# Patient Record
Sex: FEMALE | Race: OTHER | HISPANIC OR LATINO
[De-identification: names, ages, dates, MRNs, and addresses within clinical notes are randomized per-mention and may not be internally consistent; named-entity substitution may affect disease eponyms.]

---

## 2019-03-01 PROBLEM — Z00.00 ENCOUNTER FOR PREVENTIVE HEALTH EXAMINATION: Status: ACTIVE | Noted: 2019-03-01

## 2019-03-27 ENCOUNTER — APPOINTMENT (OUTPATIENT)
Dept: OBGYN | Facility: CLINIC | Age: 34
End: 2019-03-27
Payer: MEDICAID

## 2019-03-27 VITALS
BODY MASS INDEX: 23.57 KG/M2 | HEIGHT: 63 IN | DIASTOLIC BLOOD PRESSURE: 80 MMHG | SYSTOLIC BLOOD PRESSURE: 118 MMHG | WEIGHT: 133 LBS

## 2019-03-27 DIAGNOSIS — Z30.09 ENCOUNTER FOR OTHER GENERAL COUNSELING AND ADVICE ON CONTRACEPTION: ICD-10-CM

## 2019-03-27 PROCEDURE — 99385 PREV VISIT NEW AGE 18-39: CPT

## 2019-03-27 NOTE — COUNSELING
[Breast Self Exam] : breast self exam [Nutrition] : nutrition [Exercise] : exercise [Vitamins/Supplements] : vitamins/supplements [STD (testing, results, tx)] : STD (testing, results, tx) [Contraception] : contraception [Vaccines] : vaccines

## 2020-07-15 ENCOUNTER — APPOINTMENT (OUTPATIENT)
Dept: OBGYN | Facility: CLINIC | Age: 35
End: 2020-07-15

## 2020-11-04 ENCOUNTER — LABORATORY RESULT (OUTPATIENT)
Age: 35
End: 2020-11-04

## 2020-11-05 ENCOUNTER — ASOB RESULT (OUTPATIENT)
Age: 35
End: 2020-11-05

## 2020-11-05 ENCOUNTER — APPOINTMENT (OUTPATIENT)
Dept: OBGYN | Facility: CLINIC | Age: 35
End: 2020-11-05
Payer: MEDICAID

## 2020-11-05 VITALS — BODY MASS INDEX: 22.67 KG/M2 | DIASTOLIC BLOOD PRESSURE: 72 MMHG | WEIGHT: 128 LBS | SYSTOLIC BLOOD PRESSURE: 110 MMHG

## 2020-11-05 DIAGNOSIS — D64.9 ANEMIA, UNSPECIFIED: ICD-10-CM

## 2020-11-05 PROCEDURE — 76856 US EXAM PELVIC COMPLETE: CPT

## 2020-11-05 PROCEDURE — 99395 PREV VISIT EST AGE 18-39: CPT

## 2020-11-05 NOTE — HISTORY OF PRESENT ILLNESS
[FreeTextEntry1] : here for annual \par complains of heavy menses lasting 2 weeks for years\par also has anemia \par has btl

## 2020-11-25 ENCOUNTER — APPOINTMENT (OUTPATIENT)
Dept: OBGYN | Facility: CLINIC | Age: 35
End: 2020-11-25
Payer: MEDICAID

## 2020-11-25 VITALS — BODY MASS INDEX: 22.32 KG/M2 | SYSTOLIC BLOOD PRESSURE: 110 MMHG | WEIGHT: 126 LBS | DIASTOLIC BLOOD PRESSURE: 68 MMHG

## 2020-11-25 PROCEDURE — 99212 OFFICE O/P EST SF 10 MIN: CPT

## 2020-11-25 NOTE — HISTORY OF PRESENT ILLNESS
[FreeTextEntry1] : Patient presents with complaint of vaginal irritation.  Reports internal and external irritation with thick white discharged.  + yeast infection on exam.

## 2020-11-25 NOTE — PHYSICAL EXAM
[Appropriately responsive] : appropriately responsive [Alert] : alert [No Acute Distress] : no acute distress [No Lymphadenopathy] : no lymphadenopathy [Regular Rate Rhythm] : regular rate rhythm [No Murmurs] : no murmurs [Clear to Auscultation B/L] : clear to auscultation bilaterally [Soft] : soft [Non-tender] : non-tender [Non-distended] : non-distended [No HSM] : No HSM [No Lesions] : no lesions [No Mass] : no mass [Oriented x3] : oriented x3 [Labia Majora] : normal [Labia Minora] : normal [Discharge] : a  ~M vaginal discharge was present [Normal] : normal [Uterine Adnexae] : normal [FreeTextEntry4] : + yeast infection

## 2020-11-25 NOTE — COUNSELING
[Bladder Hygiene] : bladder hygiene [STD (testing, results, tx)] : STD (testing, results, tx) [Vaccines] : vaccines

## 2020-12-08 ENCOUNTER — APPOINTMENT (OUTPATIENT)
Dept: OBGYN | Facility: CLINIC | Age: 35
End: 2020-12-08
Payer: MEDICAID

## 2020-12-08 VITALS — BODY MASS INDEX: 22.67 KG/M2 | SYSTOLIC BLOOD PRESSURE: 114 MMHG | WEIGHT: 128 LBS | DIASTOLIC BLOOD PRESSURE: 72 MMHG

## 2020-12-08 PROCEDURE — 99212 OFFICE O/P EST SF 10 MIN: CPT

## 2020-12-08 PROCEDURE — 99072 ADDL SUPL MATRL&STAF TM PHE: CPT

## 2020-12-08 NOTE — PHYSICAL EXAM
[Normal] : uterus [No Bleeding] : there was no active vaginal bleeding [Uterine Adnexae] : were not tender and not enlarged [FreeTextEntry4] : + yeast infection

## 2021-03-01 ENCOUNTER — OUTPATIENT (OUTPATIENT)
Dept: OUTPATIENT SERVICES | Facility: HOSPITAL | Age: 36
LOS: 1 days | End: 2021-03-01
Payer: MEDICAID

## 2021-03-01 VITALS
DIASTOLIC BLOOD PRESSURE: 68 MMHG | SYSTOLIC BLOOD PRESSURE: 107 MMHG | HEIGHT: 63 IN | OXYGEN SATURATION: 100 % | WEIGHT: 128.09 LBS | RESPIRATION RATE: 16 BRPM | HEART RATE: 67 BPM | TEMPERATURE: 98 F

## 2021-03-01 DIAGNOSIS — N93.9 ABNORMAL UTERINE AND VAGINAL BLEEDING, UNSPECIFIED: ICD-10-CM

## 2021-03-01 DIAGNOSIS — J45.909 UNSPECIFIED ASTHMA, UNCOMPLICATED: ICD-10-CM

## 2021-03-01 DIAGNOSIS — Z29.9 ENCOUNTER FOR PROPHYLACTIC MEASURES, UNSPECIFIED: ICD-10-CM

## 2021-03-01 DIAGNOSIS — Z01.818 ENCOUNTER FOR OTHER PREPROCEDURAL EXAMINATION: ICD-10-CM

## 2021-03-01 DIAGNOSIS — Z98.51 TUBAL LIGATION STATUS: Chronic | ICD-10-CM

## 2021-03-01 LAB
ANION GAP SERPL CALC-SCNC: 5 MMOL/L — SIGNIFICANT CHANGE UP (ref 5–17)
ANISOCYTOSIS BLD QL: SIGNIFICANT CHANGE UP
APTT BLD: 31.3 SEC — SIGNIFICANT CHANGE UP (ref 27.5–35.5)
BLD GP AB SCN SERPL QL: SIGNIFICANT CHANGE UP
BUN SERPL-MCNC: 7 MG/DL — SIGNIFICANT CHANGE UP (ref 7–18)
CALCIUM SERPL-MCNC: 9.2 MG/DL — SIGNIFICANT CHANGE UP (ref 8.4–10.5)
CHLORIDE SERPL-SCNC: 105 MMOL/L — SIGNIFICANT CHANGE UP (ref 96–108)
CO2 SERPL-SCNC: 27 MMOL/L — SIGNIFICANT CHANGE UP (ref 22–31)
CREAT SERPL-MCNC: 0.62 MG/DL — SIGNIFICANT CHANGE UP (ref 0.5–1.3)
GLUCOSE SERPL-MCNC: 98 MG/DL — SIGNIFICANT CHANGE UP (ref 70–99)
HCT VFR BLD CALC: 30.3 % — LOW (ref 34.5–45)
HGB BLD-MCNC: 8.9 G/DL — LOW (ref 11.5–15.5)
HYPOCHROMIA BLD QL: SLIGHT — SIGNIFICANT CHANGE UP
INR BLD: 0.99 RATIO — SIGNIFICANT CHANGE UP (ref 0.88–1.16)
LG PLATELETS BLD QL AUTO: SLIGHT — SIGNIFICANT CHANGE UP
MANUAL DIF COMMENT BLD-IMP: SIGNIFICANT CHANGE UP
MCHC RBC-ENTMCNC: 18.2 PG — LOW (ref 27–34)
MCHC RBC-ENTMCNC: 29.4 GM/DL — LOW (ref 32–36)
MCV RBC AUTO: 61.8 FL — LOW (ref 80–100)
MICROCYTES BLD QL: SIGNIFICANT CHANGE UP
NRBC # BLD: 0 /100 WBCS — SIGNIFICANT CHANGE UP (ref 0–0)
PLAT MORPH BLD: NORMAL — SIGNIFICANT CHANGE UP
PLATELET # BLD AUTO: 389 K/UL — SIGNIFICANT CHANGE UP (ref 150–400)
POIKILOCYTOSIS BLD QL AUTO: SLIGHT — SIGNIFICANT CHANGE UP
POLYCHROMASIA BLD QL SMEAR: SLIGHT — SIGNIFICANT CHANGE UP
POTASSIUM SERPL-MCNC: 3.6 MMOL/L — SIGNIFICANT CHANGE UP (ref 3.5–5.3)
POTASSIUM SERPL-SCNC: 3.6 MMOL/L — SIGNIFICANT CHANGE UP (ref 3.5–5.3)
PROTHROM AB SERPL-ACNC: 11.8 SEC — SIGNIFICANT CHANGE UP (ref 10.6–13.6)
RBC # BLD: 4.9 M/UL — SIGNIFICANT CHANGE UP (ref 3.8–5.2)
RBC # FLD: 20.1 % — HIGH (ref 10.3–14.5)
RBC BLD AUTO: ABNORMAL
SODIUM SERPL-SCNC: 137 MMOL/L — SIGNIFICANT CHANGE UP (ref 135–145)
SPHEROCYTES BLD QL SMEAR: SLIGHT — SIGNIFICANT CHANGE UP
TARGETS BLD QL SMEAR: SLIGHT — SIGNIFICANT CHANGE UP
WBC # BLD: 7.97 K/UL — SIGNIFICANT CHANGE UP (ref 3.8–10.5)
WBC # FLD AUTO: 7.97 K/UL — SIGNIFICANT CHANGE UP (ref 3.8–10.5)

## 2021-03-01 PROCEDURE — G0463: CPT

## 2021-03-01 NOTE — H&P PST ADULT - NEGATIVE NEUROLOGICAL SYMPTOMS
no transient paralysis/no weakness/no paresthesias/no generalized seizures/no focal seizures/no syncope/no difficulty walking/no headache

## 2021-03-01 NOTE — H&P PST ADULT - NSANTHOSAYNRD_GEN_A_CORE
No. MAKENNA screening performed.  STOP BANG Legend: 0-2 = LOW Risk; 3-4 = INTERMEDIATE Risk; 5-8 = HIGH Risk

## 2021-03-01 NOTE — H&P PST ADULT - NSICDXPROBLEM_GEN_ALL_CORE_FT
PROBLEM DIAGNOSES  Problem: Prophylactic measure  Assessment and Plan: Preoperative instructions discussed with pt and given to pt. Instructed pt that no one will be allowed to come to hospital with patient , to notify security on arrival to the lobby of the hospital that today is day of surgery. Patient notified that will need someone to come to the hospital to  after surgery, not to eat or drink anything after midnight the night before the surgery, to avoid NSAIDs such as Ibuprofen, Motrin, Aleve, Advil, naproxen before surgery, to take Tylenol if needed for pain, to report exposure to anyone with any contagious diseases including Covid-19 or if patient is exhibiting any symptoms of COVID-19. Chlorhexidine 4% soap given to pt. Instructed about use of Chlorhexidine 4% soap before surgery. Patient verbalized understanding of instructions given.    Problem: Asthma  Assessment and Plan: Continue use of inhalers and use same on day of surgery. Follow-up with PCP postop for management      Problem: Abnormal uterine and vaginal bleeding, unspecified  Assessment and Plan: Patient  is scheduled for  dilation and curettage on 3/9/2021.

## 2021-03-01 NOTE — H&P PST ADULT - HISTORY OF PRESENT ILLNESS
35 years old female presented to [facility] for evaluation before surgery, patient was diagnosed with abnormal uterine and vaginal bleeding, unspecified and is scheduled for  dilation and curettage on 3/9/2021.

## 2021-03-03 PROBLEM — J45.909 UNSPECIFIED ASTHMA, UNCOMPLICATED: Chronic | Status: ACTIVE | Noted: 2021-03-01

## 2021-03-05 ENCOUNTER — APPOINTMENT (OUTPATIENT)
Dept: OBGYN | Facility: CLINIC | Age: 36
End: 2021-03-05
Payer: MEDICAID

## 2021-03-05 VITALS — SYSTOLIC BLOOD PRESSURE: 118 MMHG | WEIGHT: 131 LBS | BODY MASS INDEX: 23.21 KG/M2 | DIASTOLIC BLOOD PRESSURE: 74 MMHG

## 2021-03-05 PROCEDURE — 99072 ADDL SUPL MATRL&STAF TM PHE: CPT

## 2021-03-05 PROCEDURE — 99212 OFFICE O/P EST SF 10 MIN: CPT

## 2021-03-05 NOTE — HISTORY OF PRESENT ILLNESS
[FreeTextEntry1] : Patient presents with yeast infection.  Discussed not douching as she is getting a lot of yeast infections.  No other complaints.  Up to date on pap

## 2021-03-05 NOTE — PHYSICAL EXAM
[Appropriately responsive] : appropriately responsive [Alert] : alert [No Acute Distress] : no acute distress [No Lymphadenopathy] : no lymphadenopathy [Regular Rate Rhythm] : regular rate rhythm [No Murmurs] : no murmurs [Clear to Auscultation B/L] : clear to auscultation bilaterally [Soft] : soft [Non-tender] : non-tender [Non-distended] : non-distended [No HSM] : No HSM [No Lesions] : no lesions [No Mass] : no mass [Oriented x3] : oriented x3 [Labia Majora] : normal [Labia Minora] : normal [Normal] : normal [Uterine Adnexae] : normal [FreeTextEntry4] : + yeast infection

## 2021-03-06 ENCOUNTER — APPOINTMENT (OUTPATIENT)
Dept: DISASTER EMERGENCY | Facility: CLINIC | Age: 36
End: 2021-03-06

## 2021-03-07 LAB — SARS-COV-2 N GENE NPH QL NAA+PROBE: NOT DETECTED

## 2021-03-08 ENCOUNTER — TRANSCRIPTION ENCOUNTER (OUTPATIENT)
Age: 36
End: 2021-03-08

## 2021-03-09 ENCOUNTER — RESULT REVIEW (OUTPATIENT)
Age: 36
End: 2021-03-09

## 2021-03-09 ENCOUNTER — OUTPATIENT (OUTPATIENT)
Dept: OUTPATIENT SERVICES | Facility: HOSPITAL | Age: 36
LOS: 1 days | End: 2021-03-09
Payer: MEDICAID

## 2021-03-09 VITALS
RESPIRATION RATE: 17 BRPM | OXYGEN SATURATION: 100 % | TEMPERATURE: 98 F | SYSTOLIC BLOOD PRESSURE: 110 MMHG | HEART RATE: 70 BPM | DIASTOLIC BLOOD PRESSURE: 70 MMHG

## 2021-03-09 VITALS
RESPIRATION RATE: 16 BRPM | HEIGHT: 63 IN | DIASTOLIC BLOOD PRESSURE: 68 MMHG | TEMPERATURE: 99 F | WEIGHT: 128.09 LBS | HEART RATE: 72 BPM | OXYGEN SATURATION: 100 % | SYSTOLIC BLOOD PRESSURE: 99 MMHG

## 2021-03-09 DIAGNOSIS — Z98.51 TUBAL LIGATION STATUS: Chronic | ICD-10-CM

## 2021-03-09 DIAGNOSIS — Z01.818 ENCOUNTER FOR OTHER PREPROCEDURAL EXAMINATION: ICD-10-CM

## 2021-03-09 DIAGNOSIS — N93.9 ABNORMAL UTERINE AND VAGINAL BLEEDING, UNSPECIFIED: ICD-10-CM

## 2021-03-09 LAB
BLD GP AB SCN SERPL QL: SIGNIFICANT CHANGE UP
HCG UR QL: NEGATIVE — SIGNIFICANT CHANGE UP
HCT VFR BLD CALC: 29.9 % — LOW (ref 34.5–45)
HGB BLD-MCNC: 8.7 G/DL — LOW (ref 11.5–15.5)
MCHC RBC-ENTMCNC: 18.1 PG — LOW (ref 27–34)
MCHC RBC-ENTMCNC: 29.1 GM/DL — LOW (ref 32–36)
MCV RBC AUTO: 62.2 FL — LOW (ref 80–100)
NRBC # BLD: 0 /100 WBCS — SIGNIFICANT CHANGE UP (ref 0–0)
PLATELET # BLD AUTO: 485 K/UL — HIGH (ref 150–400)
RBC # BLD: 4.81 M/UL — SIGNIFICANT CHANGE UP (ref 3.8–5.2)
RBC # FLD: 19.6 % — HIGH (ref 10.3–14.5)
WBC # BLD: 7 K/UL — SIGNIFICANT CHANGE UP (ref 3.8–10.5)
WBC # FLD AUTO: 7 K/UL — SIGNIFICANT CHANGE UP (ref 3.8–10.5)

## 2021-03-09 PROCEDURE — 58558 HYSTEROSCOPY BIOPSY: CPT

## 2021-03-09 PROCEDURE — 86900 BLOOD TYPING SEROLOGIC ABO: CPT

## 2021-03-09 PROCEDURE — 88305 TISSUE EXAM BY PATHOLOGIST: CPT

## 2021-03-09 PROCEDURE — 86901 BLOOD TYPING SEROLOGIC RH(D): CPT

## 2021-03-09 PROCEDURE — 85027 COMPLETE CBC AUTOMATED: CPT

## 2021-03-09 PROCEDURE — 88305 TISSUE EXAM BY PATHOLOGIST: CPT | Mod: 26

## 2021-03-09 PROCEDURE — 81025 URINE PREGNANCY TEST: CPT

## 2021-03-09 PROCEDURE — 86850 RBC ANTIBODY SCREEN: CPT

## 2021-03-09 PROCEDURE — 36415 COLL VENOUS BLD VENIPUNCTURE: CPT

## 2021-03-09 RX ORDER — SODIUM CHLORIDE 9 MG/ML
3 INJECTION INTRAMUSCULAR; INTRAVENOUS; SUBCUTANEOUS EVERY 8 HOURS
Refills: 0 | Status: DISCONTINUED | OUTPATIENT
Start: 2021-03-09 | End: 2021-03-09

## 2021-03-09 RX ORDER — IBUPROFEN 200 MG
1 TABLET ORAL
Qty: 12 | Refills: 0
Start: 2021-03-09 | End: 2021-03-11

## 2021-03-09 RX ORDER — IBUPROFEN 200 MG
600 TABLET ORAL EVERY 6 HOURS
Refills: 0 | Status: DISCONTINUED | OUTPATIENT
Start: 2021-03-09 | End: 2021-03-16

## 2021-03-09 NOTE — ASU DISCHARGE PLAN (ADULT/PEDIATRIC) - CARE PROVIDER_API CALL
Charan Huynh  OBSTETRICS AND GYNECOLOGY  [address] [facility], [place] [address]  Phone: [phone]  Fax: [phone]  Follow Up Time: 2 weeks

## 2021-03-09 NOTE — ASU DISCHARGE PLAN (ADULT/PEDIATRIC) - "IF YOU OR YOUR GUARDIAN/FAMILY IS A SMOKER, IT IS IMPORTANT FOR YOUR HEALTH TO STOP SMOKING. PLEASE BE AWARE THAT SECOND HAND SMOKE IS ALSO HARMFUL."
Additional Notes: When pink patches turn white use derma-smooth an additional week then stop. Detail Level: Simple Statement Selected

## 2021-03-11 LAB — SURGICAL PATHOLOGY STUDY: SIGNIFICANT CHANGE UP

## 2021-03-24 ENCOUNTER — APPOINTMENT (OUTPATIENT)
Dept: OBGYN | Facility: CLINIC | Age: 36
End: 2021-03-24
Payer: MEDICAID

## 2021-03-24 VITALS — BODY MASS INDEX: 23.21 KG/M2 | SYSTOLIC BLOOD PRESSURE: 122 MMHG | WEIGHT: 131 LBS | DIASTOLIC BLOOD PRESSURE: 76 MMHG

## 2021-03-24 PROCEDURE — 99072 ADDL SUPL MATRL&STAF TM PHE: CPT

## 2021-03-24 PROCEDURE — 99212 OFFICE O/P EST SF 10 MIN: CPT

## 2021-03-24 RX ORDER — TRANEXAMIC ACID 650 MG/1
650 TABLET ORAL
Qty: 1 | Refills: 1 | Status: ACTIVE | COMMUNITY
Start: 2021-03-24 | End: 1900-01-01

## 2021-03-25 NOTE — HISTORY OF PRESENT ILLNESS
[Pain is well-controlled] : pain is well-controlled [Fever] : no fever [Chills] : no chills [Nausea] : no nausea [Vomiting] : no vomiting [Clean/Dry/Intact] : clean, dry and intact [Erythema] : not erythematous [None] : no vaginal bleeding [Normal] : normal [Pathology reviewed] : pathology reviewed [de-identified] : feels good discussed the results .

## 2021-04-08 ENCOUNTER — APPOINTMENT (OUTPATIENT)
Dept: OBGYN | Facility: CLINIC | Age: 36
End: 2021-04-08
Payer: MEDICAID

## 2021-04-08 VITALS — BODY MASS INDEX: 23.03 KG/M2 | DIASTOLIC BLOOD PRESSURE: 78 MMHG | SYSTOLIC BLOOD PRESSURE: 118 MMHG | WEIGHT: 130 LBS

## 2021-04-08 PROCEDURE — 99213 OFFICE O/P EST LOW 20 MIN: CPT

## 2021-04-08 PROCEDURE — 99072 ADDL SUPL MATRL&STAF TM PHE: CPT

## 2021-05-07 ENCOUNTER — APPOINTMENT (OUTPATIENT)
Dept: OBGYN | Facility: CLINIC | Age: 36
End: 2021-05-07
Payer: MEDICAID

## 2021-05-07 VITALS — DIASTOLIC BLOOD PRESSURE: 74 MMHG | BODY MASS INDEX: 23.91 KG/M2 | WEIGHT: 135 LBS | SYSTOLIC BLOOD PRESSURE: 116 MMHG

## 2021-05-07 DIAGNOSIS — B37.3 CANDIDIASIS OF VULVA AND VAGINA: ICD-10-CM

## 2021-05-07 PROCEDURE — 99072 ADDL SUPL MATRL&STAF TM PHE: CPT

## 2021-05-07 PROCEDURE — 99213 OFFICE O/P EST LOW 20 MIN: CPT

## 2021-05-07 NOTE — HISTORY OF PRESENT ILLNESS
[FreeTextEntry1] : she is having a recurrent episode of candidiasis\par she is currently on suppressive therapy

## 2021-05-07 NOTE — PHYSICAL EXAM
[Labia Majora] : normal [Labia Minora] : normal [Discharge] : a  ~M vaginal discharge was present [Normal] : normal [Uterine Adnexae] : normal [FreeTextEntry4] : yeast

## 2021-05-28 ENCOUNTER — APPOINTMENT (OUTPATIENT)
Dept: OBGYN | Facility: CLINIC | Age: 36
End: 2021-05-28
Payer: MEDICAID

## 2021-05-28 DIAGNOSIS — N92.0 EXCESSIVE AND FREQUENT MENSTRUATION WITH REGULAR CYCLE: ICD-10-CM

## 2021-05-28 DIAGNOSIS — R87.610 ATYPICAL SQUAMOUS CELLS OF UNDETERMINED SIGNIFICANCE ON CYTOLOGIC SMEAR OF CERVIX (ASC-US): ICD-10-CM

## 2021-05-28 PROCEDURE — 99213 OFFICE O/P EST LOW 20 MIN: CPT

## 2021-05-28 RX ORDER — FLUCONAZOLE 150 MG/1
150 TABLET ORAL
Qty: 1 | Refills: 0 | Status: DISCONTINUED | COMMUNITY
Start: 2020-11-25 | End: 2021-05-28

## 2021-05-28 RX ORDER — TERCONAZOLE 8 MG/G
0.8 CREAM VAGINAL
Qty: 1 | Refills: 0 | Status: DISCONTINUED | COMMUNITY
Start: 2020-12-08 | End: 2021-05-28

## 2021-05-28 RX ORDER — FLUCONAZOLE 150 MG/1
150 TABLET ORAL
Qty: 16 | Refills: 0 | Status: DISCONTINUED | COMMUNITY
Start: 2021-04-08 | End: 2021-05-28

## 2021-05-28 RX ORDER — METRONIDAZOLE 7.5 MG/G
0.75 GEL VAGINAL
Qty: 1 | Refills: 1 | Status: ACTIVE | COMMUNITY
Start: 2021-05-28 | End: 1900-01-01

## 2021-05-28 RX ORDER — MEDROXYPROGESTERONE ACETATE 10 MG/1
10 TABLET ORAL DAILY
Qty: 10 | Refills: 0 | Status: DISCONTINUED | COMMUNITY
Start: 2021-03-11 | End: 2021-05-28

## 2021-05-28 RX ORDER — FLUCONAZOLE 150 MG/1
150 TABLET ORAL
Qty: 3 | Refills: 0 | Status: DISCONTINUED | COMMUNITY
Start: 2021-04-08 | End: 2021-05-28

## 2021-05-28 RX ORDER — VALACYCLOVIR 500 MG/1
500 TABLET, FILM COATED ORAL TWICE DAILY
Qty: 30 | Refills: 2 | Status: DISCONTINUED | COMMUNITY
Start: 2019-03-27 | End: 2021-05-28

## 2021-05-28 RX ORDER — TERCONAZOLE 8 MG/G
0.8 CREAM VAGINAL
Qty: 1 | Refills: 0 | Status: DISCONTINUED | COMMUNITY
Start: 2021-05-07 | End: 2021-05-28

## 2021-05-28 RX ORDER — FLUCONAZOLE 150 MG/1
150 TABLET ORAL
Qty: 3 | Refills: 1 | Status: DISCONTINUED | COMMUNITY
Start: 2021-05-07 | End: 2021-05-28

## 2021-05-28 RX ORDER — NORGESTIMATE AND ETHINYL ESTRADIOL 7DAYSX3 LO
0.18/0.215/0.25 KIT ORAL DAILY
Qty: 1 | Refills: 11 | Status: DISCONTINUED | COMMUNITY
Start: 2019-03-27 | End: 2021-05-28

## 2021-05-28 RX ORDER — METRONIDAZOLE 500 MG/1
500 TABLET ORAL TWICE DAILY
Qty: 14 | Refills: 0 | Status: DISCONTINUED | COMMUNITY
Start: 2021-01-29 | End: 2021-05-28

## 2021-05-28 RX ORDER — CLOTRIMAZOLE AND BETAMETHASONE DIPROPIONATE 10; .5 MG/G; MG/G
1-0.05 CREAM TOPICAL TWICE DAILY
Qty: 1 | Refills: 0 | Status: DISCONTINUED | COMMUNITY
Start: 2019-03-27 | End: 2021-05-28

## 2021-05-28 NOTE — COUNSELING
[Nutrition/ Exercise/ Weight Management] : nutrition, exercise, weight management [Vaccines] : vaccines

## 2021-06-22 ENCOUNTER — LABORATORY RESULT (OUTPATIENT)
Age: 36
End: 2021-06-22

## 2021-06-23 ENCOUNTER — APPOINTMENT (OUTPATIENT)
Dept: OBGYN | Facility: CLINIC | Age: 36
End: 2021-06-23
Payer: MEDICAID

## 2021-06-23 VITALS — BODY MASS INDEX: 23.56 KG/M2 | WEIGHT: 133 LBS

## 2021-06-23 DIAGNOSIS — N93.9 ABNORMAL UTERINE AND VAGINAL BLEEDING, UNSPECIFIED: ICD-10-CM

## 2021-06-23 DIAGNOSIS — D25.0 SUBMUCOUS LEIOMYOMA OF UTERUS: ICD-10-CM

## 2021-06-23 PROCEDURE — 99213 OFFICE O/P EST LOW 20 MIN: CPT

## 2021-06-23 NOTE — HISTORY OF PRESENT ILLNESS
[FreeTextEntry1] : pt comes for repeat pap , she has heavy periods . discussed the submucosal fibroid and removal along with an ablation .

## 2021-07-01 ENCOUNTER — APPOINTMENT (OUTPATIENT)
Dept: OBGYN | Facility: CLINIC | Age: 36
End: 2021-07-01
Payer: MEDICAID

## 2021-07-01 DIAGNOSIS — R87.610 ATYPICAL SQUAMOUS CELLS OF UNDETERMINED SIGNIFICANCE ON CYTOLOGIC SMEAR OF CERVIX (ASC-US): ICD-10-CM

## 2021-07-01 DIAGNOSIS — R87.810 ATYPICAL SQUAMOUS CELLS OF UNDETERMINED SIGNIFICANCE ON CYTOLOGIC SMEAR OF CERVIX (ASC-US): ICD-10-CM

## 2021-07-01 PROCEDURE — 99213 OFFICE O/P EST LOW 20 MIN: CPT | Mod: 95

## 2021-07-14 ENCOUNTER — APPOINTMENT (OUTPATIENT)
Dept: OBGYN | Facility: CLINIC | Age: 36
End: 2021-07-14
Payer: MEDICAID

## 2021-07-14 VITALS — WEIGHT: 136 LBS | BODY MASS INDEX: 24.09 KG/M2 | SYSTOLIC BLOOD PRESSURE: 112 MMHG | DIASTOLIC BLOOD PRESSURE: 74 MMHG

## 2021-07-14 PROCEDURE — 57454 BX/CURETT OF CERVIX W/SCOPE: CPT

## 2021-07-14 RX ORDER — ETODOLAC 500 MG/1
500 TABLET, FILM COATED, EXTENDED RELEASE ORAL DAILY
Qty: 1 | Refills: 3 | Status: ACTIVE | COMMUNITY
Start: 2021-07-14 | End: 1900-01-01

## 2021-07-14 NOTE — PROCEDURE
[Colposcopy] : Colposcopy  [Time out performed] : Pre-procedure time out performed.  Patient's name, date of birth and procedure confirmed. [Consent Obtained] : Consent obtained [Risks] : risks [Benefits] : benefits [Alternatives] : alternatives [Patient] : patient [Infection] : infection [Bleeding] : bleeding [Allergic Reaction] : allergic reaction [HPV High Risk] : HPV high risk [No Premedication] : no premedication [Colposcopy Adequate] : colposcopy adequate [No Abnormalities] : no abnormalities [Hemostasis Obtained] : Hemostasis obtained [Tolerated Well] : the patient tolerated the procedure well [de-identified] : hpv

## 2021-07-23 ENCOUNTER — APPOINTMENT (OUTPATIENT)
Dept: OBGYN | Facility: CLINIC | Age: 36
End: 2021-07-23

## 2021-10-19 ENCOUNTER — OUTPATIENT (OUTPATIENT)
Dept: OUTPATIENT SERVICES | Facility: HOSPITAL | Age: 36
LOS: 1 days | End: 2021-10-19
Payer: MEDICAID

## 2021-10-19 VITALS
HEIGHT: 63 IN | SYSTOLIC BLOOD PRESSURE: 105 MMHG | TEMPERATURE: 99 F | RESPIRATION RATE: 16 BRPM | HEART RATE: 72 BPM | WEIGHT: 139.99 LBS | DIASTOLIC BLOOD PRESSURE: 72 MMHG | OXYGEN SATURATION: 98 %

## 2021-10-19 DIAGNOSIS — J45.909 UNSPECIFIED ASTHMA, UNCOMPLICATED: ICD-10-CM

## 2021-10-19 DIAGNOSIS — Z01.818 ENCOUNTER FOR OTHER PREPROCEDURAL EXAMINATION: ICD-10-CM

## 2021-10-19 DIAGNOSIS — N93.9 ABNORMAL UTERINE AND VAGINAL BLEEDING, UNSPECIFIED: ICD-10-CM

## 2021-10-19 DIAGNOSIS — Z98.51 TUBAL LIGATION STATUS: Chronic | ICD-10-CM

## 2021-10-19 DIAGNOSIS — D25.0 SUBMUCOUS LEIOMYOMA OF UTERUS: ICD-10-CM

## 2021-10-19 DIAGNOSIS — Z29.9 ENCOUNTER FOR PROPHYLACTIC MEASURES, UNSPECIFIED: ICD-10-CM

## 2021-10-19 DIAGNOSIS — Z98.890 OTHER SPECIFIED POSTPROCEDURAL STATES: Chronic | ICD-10-CM

## 2021-10-19 LAB
ANION GAP SERPL CALC-SCNC: 6 MMOL/L — SIGNIFICANT CHANGE UP (ref 5–17)
BLD GP AB SCN SERPL QL: SIGNIFICANT CHANGE UP
BUN SERPL-MCNC: 13 MG/DL — SIGNIFICANT CHANGE UP (ref 7–18)
CALCIUM SERPL-MCNC: 9.4 MG/DL — SIGNIFICANT CHANGE UP (ref 8.4–10.5)
CHLORIDE SERPL-SCNC: 105 MMOL/L — SIGNIFICANT CHANGE UP (ref 96–108)
CO2 SERPL-SCNC: 28 MMOL/L — SIGNIFICANT CHANGE UP (ref 22–31)
CREAT SERPL-MCNC: 0.64 MG/DL — SIGNIFICANT CHANGE UP (ref 0.5–1.3)
GLUCOSE SERPL-MCNC: 104 MG/DL — HIGH (ref 70–99)
HCT VFR BLD CALC: 38.4 % — SIGNIFICANT CHANGE UP (ref 34.5–45)
HGB BLD-MCNC: 12.7 G/DL — SIGNIFICANT CHANGE UP (ref 11.5–15.5)
MCHC RBC-ENTMCNC: 29.1 PG — SIGNIFICANT CHANGE UP (ref 27–34)
MCHC RBC-ENTMCNC: 33.1 GM/DL — SIGNIFICANT CHANGE UP (ref 32–36)
MCV RBC AUTO: 87.9 FL — SIGNIFICANT CHANGE UP (ref 80–100)
NRBC # BLD: 0 /100 WBCS — SIGNIFICANT CHANGE UP (ref 0–0)
PLATELET # BLD AUTO: 380 K/UL — SIGNIFICANT CHANGE UP (ref 150–400)
POTASSIUM SERPL-MCNC: 4.1 MMOL/L — SIGNIFICANT CHANGE UP (ref 3.5–5.3)
POTASSIUM SERPL-SCNC: 4.1 MMOL/L — SIGNIFICANT CHANGE UP (ref 3.5–5.3)
RBC # BLD: 4.37 M/UL — SIGNIFICANT CHANGE UP (ref 3.8–5.2)
RBC # FLD: 12.1 % — SIGNIFICANT CHANGE UP (ref 10.3–14.5)
SODIUM SERPL-SCNC: 139 MMOL/L — SIGNIFICANT CHANGE UP (ref 135–145)
WBC # BLD: 8.04 K/UL — SIGNIFICANT CHANGE UP (ref 3.8–10.5)
WBC # FLD AUTO: 8.04 K/UL — SIGNIFICANT CHANGE UP (ref 3.8–10.5)

## 2021-10-19 PROCEDURE — 93005 ELECTROCARDIOGRAM TRACING: CPT

## 2021-10-19 PROCEDURE — G0463: CPT

## 2021-10-19 NOTE — H&P PST ADULT - NSICDXPASTMEDICALHX_GEN_ALL_CORE_FT
PAST MEDICAL HISTORY:  Asthma      PAST MEDICAL HISTORY:  Anemia .  Received iron infusion x 3    Asthma

## 2021-10-19 NOTE — H&P PST ADULT - HISTORY OF PRESENT ILLNESS
35 yo female with history of Asthma (last attack x 10 years, no intubation), reports the above.  She is scheduled for : Hysteroscopic Myomectomy  Dilation and Curettage   Hysteroscopy with Endometrial Ablation, on 10/26/21 37 yo female with history of Asthma (last attack x 10 years, no intubation), Anemia, reports the above.  She is scheduled for : Hysteroscopic Myomectomy  Dilation and Curettage   Hysteroscopy with Endometrial Ablation, on 10/26/21

## 2021-10-19 NOTE — H&P PST ADULT - ASSESSMENT
37 yo female is scheduled for : Hysteroscopic Myomectomy  Dilation and Curettage   Hysteroscopy with Endometrial Ablation, on 10/26/21

## 2021-10-23 ENCOUNTER — APPOINTMENT (OUTPATIENT)
Dept: DISASTER EMERGENCY | Facility: CLINIC | Age: 36
End: 2021-10-23

## 2021-10-23 DIAGNOSIS — Z01.818 ENCOUNTER FOR OTHER PREPROCEDURAL EXAMINATION: ICD-10-CM

## 2021-10-24 LAB — SARS-COV-2 N GENE NPH QL NAA+PROBE: NOT DETECTED

## 2021-10-25 ENCOUNTER — TRANSCRIPTION ENCOUNTER (OUTPATIENT)
Age: 36
End: 2021-10-25

## 2021-10-26 ENCOUNTER — INPATIENT (INPATIENT)
Facility: HOSPITAL | Age: 36
LOS: 0 days | Discharge: HOME | DRG: 743 | End: 2021-10-27
Attending: OBSTETRICS & GYNECOLOGY | Admitting: OBSTETRICS & GYNECOLOGY
Payer: MEDICAID

## 2021-10-26 ENCOUNTER — RESULT REVIEW (OUTPATIENT)
Age: 36
End: 2021-10-26

## 2021-10-26 VITALS
SYSTOLIC BLOOD PRESSURE: 102 MMHG | RESPIRATION RATE: 16 BRPM | WEIGHT: 139.99 LBS | TEMPERATURE: 99 F | DIASTOLIC BLOOD PRESSURE: 75 MMHG | HEART RATE: 68 BPM | OXYGEN SATURATION: 99 %

## 2021-10-26 DIAGNOSIS — D64.9 ANEMIA, UNSPECIFIED: ICD-10-CM

## 2021-10-26 DIAGNOSIS — D25.0 SUBMUCOUS LEIOMYOMA OF UTERUS: ICD-10-CM

## 2021-10-26 DIAGNOSIS — J45.909 UNSPECIFIED ASTHMA, UNCOMPLICATED: ICD-10-CM

## 2021-10-26 DIAGNOSIS — Z98.890 OTHER SPECIFIED POSTPROCEDURAL STATES: Chronic | ICD-10-CM

## 2021-10-26 DIAGNOSIS — G89.18 OTHER ACUTE POSTPROCEDURAL PAIN: ICD-10-CM

## 2021-10-26 DIAGNOSIS — D62 ACUTE POSTHEMORRHAGIC ANEMIA: ICD-10-CM

## 2021-10-26 DIAGNOSIS — R10.9 UNSPECIFIED ABDOMINAL PAIN: ICD-10-CM

## 2021-10-26 DIAGNOSIS — Z98.51 TUBAL LIGATION STATUS: Chronic | ICD-10-CM

## 2021-10-26 DIAGNOSIS — N93.9 ABNORMAL UTERINE AND VAGINAL BLEEDING, UNSPECIFIED: ICD-10-CM

## 2021-10-26 LAB
BLD GP AB SCN SERPL QL: SIGNIFICANT CHANGE UP
HCG UR QL: NEGATIVE — SIGNIFICANT CHANGE UP

## 2021-10-26 PROCEDURE — 88305 TISSUE EXAM BY PATHOLOGIST: CPT | Mod: 26

## 2021-10-26 RX ORDER — HYDROMORPHONE HYDROCHLORIDE 2 MG/ML
0.5 INJECTION INTRAMUSCULAR; INTRAVENOUS; SUBCUTANEOUS
Refills: 0 | Status: DISCONTINUED | OUTPATIENT
Start: 2021-10-26 | End: 2021-10-26

## 2021-10-26 RX ORDER — ALBUTEROL 90 UG/1
2 AEROSOL, METERED ORAL
Qty: 0 | Refills: 0 | DISCHARGE

## 2021-10-26 RX ORDER — FENTANYL CITRATE 50 UG/ML
25 INJECTION INTRAVENOUS ONCE
Refills: 0 | Status: DISCONTINUED | OUTPATIENT
Start: 2021-10-26 | End: 2021-10-26

## 2021-10-26 RX ORDER — PANTOPRAZOLE SODIUM 20 MG/1
40 TABLET, DELAYED RELEASE ORAL DAILY
Refills: 0 | Status: DISCONTINUED | OUTPATIENT
Start: 2021-10-26 | End: 2021-10-27

## 2021-10-26 RX ORDER — ACETAMINOPHEN 500 MG
2 TABLET ORAL
Qty: 56 | Refills: 0
Start: 2021-10-26 | End: 2021-11-01

## 2021-10-26 RX ORDER — IBUPROFEN 200 MG
1 TABLET ORAL
Qty: 28 | Refills: 0
Start: 2021-10-26 | End: 2021-11-01

## 2021-10-26 RX ORDER — ACETAMINOPHEN 500 MG
1000 TABLET ORAL ONCE
Refills: 0 | Status: DISCONTINUED | OUTPATIENT
Start: 2021-10-27 | End: 2021-10-27

## 2021-10-26 RX ORDER — ACETAMINOPHEN 500 MG
1000 TABLET ORAL EVERY 6 HOURS
Refills: 0 | Status: DISCONTINUED | OUTPATIENT
Start: 2021-10-26 | End: 2021-10-26

## 2021-10-26 RX ORDER — KETOROLAC TROMETHAMINE 30 MG/ML
15 SYRINGE (ML) INJECTION ONCE
Refills: 0 | Status: DISCONTINUED | OUTPATIENT
Start: 2021-10-26 | End: 2021-10-26

## 2021-10-26 RX ORDER — ONDANSETRON 8 MG/1
8 TABLET, FILM COATED ORAL EVERY 8 HOURS
Refills: 0 | Status: DISCONTINUED | OUTPATIENT
Start: 2021-10-26 | End: 2021-10-27

## 2021-10-26 RX ORDER — KETOROLAC TROMETHAMINE 30 MG/ML
15 SYRINGE (ML) INJECTION EVERY 6 HOURS
Refills: 0 | Status: DISCONTINUED | OUTPATIENT
Start: 2021-10-26 | End: 2021-10-27

## 2021-10-26 RX ORDER — METOCLOPRAMIDE HCL 10 MG
10 TABLET ORAL EVERY 6 HOURS
Refills: 0 | Status: DISCONTINUED | OUTPATIENT
Start: 2021-10-26 | End: 2021-10-27

## 2021-10-26 RX ORDER — SENNA PLUS 8.6 MG/1
1 TABLET ORAL AT BEDTIME
Refills: 0 | Status: DISCONTINUED | OUTPATIENT
Start: 2021-10-26 | End: 2021-10-27

## 2021-10-26 RX ORDER — MORPHINE SULFATE 50 MG/1
4 CAPSULE, EXTENDED RELEASE ORAL EVERY 6 HOURS
Refills: 0 | Status: DISCONTINUED | OUTPATIENT
Start: 2021-10-26 | End: 2021-10-26

## 2021-10-26 RX ORDER — SODIUM CHLORIDE 9 MG/ML
3 INJECTION INTRAMUSCULAR; INTRAVENOUS; SUBCUTANEOUS EVERY 8 HOURS
Refills: 0 | Status: DISCONTINUED | OUTPATIENT
Start: 2021-10-26 | End: 2021-10-26

## 2021-10-26 RX ORDER — OXYCODONE HYDROCHLORIDE 5 MG/1
30 TABLET ORAL ONCE
Refills: 0 | Status: DISCONTINUED | OUTPATIENT
Start: 2021-10-26 | End: 2021-10-26

## 2021-10-26 RX ORDER — IBUPROFEN 200 MG
600 TABLET ORAL EVERY 6 HOURS
Refills: 0 | Status: DISCONTINUED | OUTPATIENT
Start: 2021-10-26 | End: 2021-10-26

## 2021-10-26 RX ORDER — ACETAMINOPHEN 500 MG
1000 TABLET ORAL ONCE
Refills: 0 | Status: COMPLETED | OUTPATIENT
Start: 2021-10-26 | End: 2021-10-26

## 2021-10-26 RX ORDER — HYDROMORPHONE HYDROCHLORIDE 2 MG/ML
0.5 INJECTION INTRAMUSCULAR; INTRAVENOUS; SUBCUTANEOUS EVERY 4 HOURS
Refills: 0 | Status: DISCONTINUED | OUTPATIENT
Start: 2021-10-26 | End: 2021-10-27

## 2021-10-26 RX ORDER — ACETAMINOPHEN 500 MG
1000 TABLET ORAL ONCE
Refills: 0 | Status: COMPLETED | OUTPATIENT
Start: 2021-10-27 | End: 2021-10-27

## 2021-10-26 RX ORDER — CIPROFLOXACIN LACTATE 400MG/40ML
1 VIAL (ML) INTRAVENOUS
Qty: 10 | Refills: 0
Start: 2021-10-26 | End: 2021-10-30

## 2021-10-26 RX ORDER — SIMETHICONE 80 MG/1
80 TABLET, CHEWABLE ORAL EVERY 6 HOURS
Refills: 0 | Status: DISCONTINUED | OUTPATIENT
Start: 2021-10-26 | End: 2021-10-27

## 2021-10-26 RX ORDER — OXYCODONE HYDROCHLORIDE 5 MG/1
5 TABLET ORAL EVERY 4 HOURS
Refills: 0 | Status: DISCONTINUED | OUTPATIENT
Start: 2021-10-26 | End: 2021-10-27

## 2021-10-26 RX ORDER — HYDROMORPHONE HYDROCHLORIDE 2 MG/ML
1 INJECTION INTRAMUSCULAR; INTRAVENOUS; SUBCUTANEOUS
Refills: 0 | Status: DISCONTINUED | OUTPATIENT
Start: 2021-10-26 | End: 2021-10-26

## 2021-10-26 RX ADMIN — OXYCODONE HYDROCHLORIDE 30 MILLIGRAM(S): 5 TABLET ORAL at 15:33

## 2021-10-26 RX ADMIN — ONDANSETRON 8 MILLIGRAM(S): 8 TABLET, FILM COATED ORAL at 18:58

## 2021-10-26 RX ADMIN — FENTANYL CITRATE 25 MICROGRAM(S): 50 INJECTION INTRAVENOUS at 13:45

## 2021-10-26 RX ADMIN — FENTANYL CITRATE 25 MICROGRAM(S): 50 INJECTION INTRAVENOUS at 14:40

## 2021-10-26 RX ADMIN — HYDROMORPHONE HYDROCHLORIDE 0.5 MILLIGRAM(S): 2 INJECTION INTRAMUSCULAR; INTRAVENOUS; SUBCUTANEOUS at 13:00

## 2021-10-26 RX ADMIN — HYDROMORPHONE HYDROCHLORIDE 1 MILLIGRAM(S): 2 INJECTION INTRAMUSCULAR; INTRAVENOUS; SUBCUTANEOUS at 17:02

## 2021-10-26 RX ADMIN — OXYCODONE HYDROCHLORIDE 30 MILLIGRAM(S): 5 TABLET ORAL at 16:05

## 2021-10-26 RX ADMIN — Medication 10 MILLIGRAM(S): at 21:02

## 2021-10-26 RX ADMIN — HYDROMORPHONE HYDROCHLORIDE 0.5 MILLIGRAM(S): 2 INJECTION INTRAMUSCULAR; INTRAVENOUS; SUBCUTANEOUS at 12:32

## 2021-10-26 RX ADMIN — HYDROMORPHONE HYDROCHLORIDE 0.5 MILLIGRAM(S): 2 INJECTION INTRAMUSCULAR; INTRAVENOUS; SUBCUTANEOUS at 12:43

## 2021-10-26 RX ADMIN — HYDROMORPHONE HYDROCHLORIDE 0.5 MILLIGRAM(S): 2 INJECTION INTRAMUSCULAR; INTRAVENOUS; SUBCUTANEOUS at 13:04

## 2021-10-26 RX ADMIN — Medication 15 MILLIGRAM(S): at 14:19

## 2021-10-26 RX ADMIN — Medication 15 MILLIGRAM(S): at 14:40

## 2021-10-26 RX ADMIN — SODIUM CHLORIDE 3 MILLILITER(S): 9 INJECTION INTRAMUSCULAR; INTRAVENOUS; SUBCUTANEOUS at 09:43

## 2021-10-26 RX ADMIN — HYDROMORPHONE HYDROCHLORIDE 1 MILLIGRAM(S): 2 INJECTION INTRAMUSCULAR; INTRAVENOUS; SUBCUTANEOUS at 16:57

## 2021-10-26 NOTE — ASU DISCHARGE PLAN (ADULT/PEDIATRIC) - CALL YOUR DOCTOR IF YOU HAVE ANY OF THE FOLLOWING:
Bleeding that does not stop/Swelling that gets worse/Fever greater than (need to indicate Fahrenheit or Celsius)/Unable to urinate/Excessive diarrhea

## 2021-10-26 NOTE — CONSULT NOTE ADULT - SUBJECTIVE AND OBJECTIVE BOX
Source of information: NADYA PANCHAL, Chart review  Patient language: English  : n/a    HPI:    Patient is a 36y old  Female with PMH asthma (controlled, last exacerbation years ago, never intubated, does not use medications regularly) and anemia, with PSH dilation and curettage and tubal ligation who presents to the hospital for subcutaneous leiomyoma of uterus, s/p hysteroscopic myomectomy 10/26 POD#0.  Pain consulted for acute pain. Pt seen and examined at bedside in PACU. Laying in bed, reports lower abdominal/ pelvic pain score 7/10 and tolerable  SCALE USED: (1-10 VNRS). Reports pain was severe 10/10 prior to last dose of dilaudid 1mg IV. Pt describes pain as pressure, non- radiating, alleviated by IV dilaudid, exacerbated by movement. Denies lethargy, nausea, vomiting, constipation, itchiness. Pt has not yet passed flatus. Patient stated goal for pain control: to be able to take deep breaths, get out of bed to chair and ambulate with tolerable pain control. Pt reports taking PRN Tylenol and motrin for pain at home.     PAST MEDICAL & SURGICAL HISTORY:  Asthma    Anemia  .  Received iron infusion x 3    S/P tubal ligation  2013    S/P dilation and curettage        FAMILY HISTORY:      Social History:   [X ] Denies ETOH use, illicit drug use and smoking    Allergies    No Known Drug Allergies  Seafood (Short breath)      MEDICATIONS  (STANDING):  acetaminophen     Tablet .. 1000 milliGRAM(s) Oral every 6 hours  ibuprofen  Tablet. 600 milliGRAM(s) Oral every 6 hours  pantoprazole    Tablet 40 milliGRAM(s) Oral daily    MEDICATIONS  (PRN):  acetaminophen     Tablet .. 1000 milliGRAM(s) Oral every 6 hours PRN Mild Pain (1 - 3)  HYDROmorphone  Injectable 0.5 milliGRAM(s) IV Push every 10 minutes PRN Moderate Pain (4 - 6)  HYDROmorphone  Injectable 1 milliGRAM(s) IV Push every 10 minutes PRN Severe Pain (7 - 10)  morphine  - Injectable 4 milliGRAM(s) IV Push every 6 hours PRN Severe Pain (7 - 10)  ondansetron    Tablet 8 milliGRAM(s) Oral every 8 hours PRN Nausea and/or Vomiting  senna 1 Tablet(s) Oral at bedtime PRN Constipation  simethicone 80 milliGRAM(s) Chew every 6 hours PRN Gas      Vital Signs Last 24 Hrs  T(C): 36.5 (26 Oct 2021 12:17), Max: 37.1 (26 Oct 2021 09:44)  T(F): 97.7 (26 Oct 2021 12:17), Max: 98.8 (26 Oct 2021 09:44)  HR: 100 (26 Oct 2021 16:57) (68 - 102)  BP: 126/96 (26 Oct 2021 16:57) (102/75 - 133/89)  BP(mean): 106 (26 Oct 2021 16:57) (87 - 106)  RR: 21 (26 Oct 2021 16:57) (12 - 21)  SpO2: 96% (26 Oct 2021 16:57) (96% - 100%)    LABS: Reviewed.    ORT Score -   Family Hx of substance abuse	Female	      Male  Alcohol 	                                           1                     3  Illegal drugs	                                   2                     3  Rx drugs                                           4 	                  4  Personal Hx of substance abuse		  Alcohol 	                                          3	                  3  Illegal drugs                                     4	                  4  Rx drugs                                            5 	                  5  Age between 16- 45 years	           1                     1  hx preadolescent sexual abuse	   3 	                  0  Psychological disease		  ADD, OCD, bipolar, schizophrenia   2	          2  Depression                                           1 	          1  Total: 1    a score of 3 or lower indicates low risk for opioid abuse		  a score of 4-7 indicates moderate risk for opioid abuse		  a score of 8 or higher indicates high risk for opioid abuse    REVIEW OF SYSTEMS:  CONSTITUTIONAL: No fever or fatigue  HEENT:  No difficulty hearing, no change in vision  NECK: No pain or stiffness  RESPIRATORY: No cough, wheezing, chills or hemoptysis; No shortness of breath  CARDIOVASCULAR: No chest pain, palpitations, dizziness, or leg swelling  GASTROINTESTINAL: No loss of appetite, decreased PO intake. + lower abdominal/ pelvic pain. No nausea, vomiting; No diarrhea or constipation.   GENITOURINARY: No dysuria, frequency, hematuria, retention or incontinence  MUSCULOSKELETAL: No joint pain or swelling; No muscle, back, or extremity pain, no upper or lower motor strength weakness, no saddle anesthesia, bowel/bladder incontinence, no falls   NEURO: No headaches, No numbness/tingling b/l LE, No weakness  PSYCHIATRIC: No depression, anxiety or difficulty sleeping    PHYSICAL EXAM:  GENERAL:  Alert & Oriented X4, cooperative, NAD, Good concentration. Speech is clear.   RESPIRATORY: Respirations even and unlabored. Clear to auscultation bilaterally; No rales, rhonchi, wheezing, or rubs  CARDIOVASCULAR: Normal S1/S2, regular rate and rhythm; No murmurs, rubs, or gallops. No JVD.   GASTROINTESTINAL:  Soft, + pelvic tenderness, Nondistended; No bowel sounds present  PERIPHERAL VASCULAR:  Extremities warm without edema. 2+ Peripheral Pulses, No cyanosis, No calf tenderness  MUSCULOSKELETAL: Motor Strength 5/5 B/L upper and lower extremities; moves all extremities equally against gravity; ROM intact; No tenderness on palpation of all joints.   SKIN: Warm, dry, intact. No rashes, lesions, scars or wounds.     Risk factors associated with adverse outcomes related to opioid treatment  [ ]  Concurrent benzodiazepine use  [ ]  History/ Active substance use or alcohol use disorder  [ ] Psychiatric co-morbidity  [ ] Sleep apnea  [ ] COPD  [ ] BMI> 35  [ ] Liver dysfunction  [ ] Renal dysfunction  [ ] CHF  [ ] Smoker  [ ]  Age > 60 years    [X ]  [place]  Reviewed and Copied to Chart. See below.    Plan of care and goal oriented pain management treatment options were discussed with patient and /or primary care giver; all questions and concerns were addressed and care was aligned with patient's wishes.    Educated patient on goal oriented pain management treatment options

## 2021-10-26 NOTE — ASU DISCHARGE PLAN (ADULT/PEDIATRIC) - CARE PROVIDER_API CALL
Charan Huynh  OBSTETRICS AND GYNECOLOGY  [address] [facility], [place] [address]  Phone: [phone]  Fax: [phone]  Follow Up Time:

## 2021-10-26 NOTE — CONSULT NOTE ADULT - PROBLEM SELECTOR RECOMMENDATION 9
Pt with acute postoperative lower abdominal and pelvic pain which is somatic and neuropathic in nature due to s/p hysteroscopic myomectomy 10/26 POD#0.   Opioid pain recommendations   - Dilaudid 0.5mg IV q4h PRN severe pain. Monitor for sedation/ respiratory depression.   - Oxycodone 5 mg PO q 4 hours PRN moderate pain. Monitor for sedation/ respiratory depression.   Non-opioid pain recommendations   - Acetaminophen 1 gram IV q 6 hours for 24 hours only. Monitor LFTs  - Will discontinue ibuprofen 600mg PO q6h 6 hours, and start toradol 15mg IV q6h x 24 hr. Last dose given at 14:20).    Bowel Regimen  - Per primary team  Mild pain   - Non-pharmacological pain treatment recommendations  - Warm/ Cool packs PRN   - Repositioning, imagery, relaxation, distraction.  - Physical therapy OOB if no contraindications   Recommendations discussed with primary team and RN

## 2021-10-26 NOTE — CONSULT NOTE ADULT - ASSESSMENT
Confidential Drug Utilization Report  Search Terms: Maribeth eWlls, 1985Search Date: 10/26/2021 17:33:39 PM  The Drug Utilization Report below displays all of the controlled substance prescriptions, if any, that your patient has filled in the last twelve months. The information displayed on this report is compiled from pharmacy submissions to the Department, and accurately reflects the information as submitted by the pharmacies.    This report was requested by: Celi Hanna | Reference #: [id number]    There are no results for the search terms that you entered.

## 2021-10-27 ENCOUNTER — TRANSCRIPTION ENCOUNTER (OUTPATIENT)
Age: 36
End: 2021-10-27

## 2021-10-27 VITALS
DIASTOLIC BLOOD PRESSURE: 75 MMHG | RESPIRATION RATE: 16 BRPM | HEART RATE: 84 BPM | OXYGEN SATURATION: 95 % | SYSTOLIC BLOOD PRESSURE: 123 MMHG | TEMPERATURE: 99 F

## 2021-10-27 DIAGNOSIS — Z98.890 OTHER SPECIFIED POSTPROCEDURAL STATES: ICD-10-CM

## 2021-10-27 LAB
ANION GAP SERPL CALC-SCNC: 7 MMOL/L — SIGNIFICANT CHANGE UP (ref 5–17)
BASOPHILS # BLD AUTO: 0.02 K/UL — SIGNIFICANT CHANGE UP (ref 0–0.2)
BASOPHILS NFR BLD AUTO: 0.2 % — SIGNIFICANT CHANGE UP (ref 0–2)
BUN SERPL-MCNC: 8 MG/DL — SIGNIFICANT CHANGE UP (ref 7–18)
CALCIUM SERPL-MCNC: 9 MG/DL — SIGNIFICANT CHANGE UP (ref 8.4–10.5)
CHLORIDE SERPL-SCNC: 105 MMOL/L — SIGNIFICANT CHANGE UP (ref 96–108)
CO2 SERPL-SCNC: 27 MMOL/L — SIGNIFICANT CHANGE UP (ref 22–31)
COVID-19 SPIKE DOMAIN AB INTERP: POSITIVE
COVID-19 SPIKE DOMAIN ANTIBODY RESULT: >250 U/ML — HIGH
CREAT SERPL-MCNC: 0.68 MG/DL — SIGNIFICANT CHANGE UP (ref 0.5–1.3)
EOSINOPHIL # BLD AUTO: 0.09 K/UL — SIGNIFICANT CHANGE UP (ref 0–0.5)
EOSINOPHIL NFR BLD AUTO: 1 % — SIGNIFICANT CHANGE UP (ref 0–6)
GLUCOSE SERPL-MCNC: 111 MG/DL — HIGH (ref 70–99)
HCT VFR BLD CALC: 36.4 % — SIGNIFICANT CHANGE UP (ref 34.5–45)
HGB BLD-MCNC: 12.5 G/DL — SIGNIFICANT CHANGE UP (ref 11.5–15.5)
IMM GRANULOCYTES NFR BLD AUTO: 0.6 % — SIGNIFICANT CHANGE UP (ref 0–1.5)
LYMPHOCYTES # BLD AUTO: 2.02 K/UL — SIGNIFICANT CHANGE UP (ref 1–3.3)
LYMPHOCYTES # BLD AUTO: 22.6 % — SIGNIFICANT CHANGE UP (ref 13–44)
MCHC RBC-ENTMCNC: 30 PG — SIGNIFICANT CHANGE UP (ref 27–34)
MCHC RBC-ENTMCNC: 34.3 GM/DL — SIGNIFICANT CHANGE UP (ref 32–36)
MCV RBC AUTO: 87.5 FL — SIGNIFICANT CHANGE UP (ref 80–100)
MONOCYTES # BLD AUTO: 0.59 K/UL — SIGNIFICANT CHANGE UP (ref 0–0.9)
MONOCYTES NFR BLD AUTO: 6.6 % — SIGNIFICANT CHANGE UP (ref 2–14)
NEUTROPHILS # BLD AUTO: 6.18 K/UL — SIGNIFICANT CHANGE UP (ref 1.8–7.4)
NEUTROPHILS NFR BLD AUTO: 69 % — SIGNIFICANT CHANGE UP (ref 43–77)
NRBC # BLD: 0 /100 WBCS — SIGNIFICANT CHANGE UP (ref 0–0)
PLATELET # BLD AUTO: 375 K/UL — SIGNIFICANT CHANGE UP (ref 150–400)
POTASSIUM SERPL-MCNC: 3.3 MMOL/L — LOW (ref 3.5–5.3)
POTASSIUM SERPL-SCNC: 3.3 MMOL/L — LOW (ref 3.5–5.3)
RBC # BLD: 4.16 M/UL — SIGNIFICANT CHANGE UP (ref 3.8–5.2)
RBC # FLD: 12.1 % — SIGNIFICANT CHANGE UP (ref 10.3–14.5)
SARS-COV-2 IGG+IGM SERPL QL IA: >250 U/ML — HIGH
SARS-COV-2 IGG+IGM SERPL QL IA: POSITIVE
SODIUM SERPL-SCNC: 139 MMOL/L — SIGNIFICANT CHANGE UP (ref 135–145)
WBC # BLD: 8.95 K/UL — SIGNIFICANT CHANGE UP (ref 3.8–10.5)
WBC # FLD AUTO: 8.95 K/UL — SIGNIFICANT CHANGE UP (ref 3.8–10.5)

## 2021-10-27 PROCEDURE — 86901 BLOOD TYPING SEROLOGIC RH(D): CPT

## 2021-10-27 PROCEDURE — 36415 COLL VENOUS BLD VENIPUNCTURE: CPT

## 2021-10-27 PROCEDURE — 86900 BLOOD TYPING SEROLOGIC ABO: CPT

## 2021-10-27 PROCEDURE — 80048 BASIC METABOLIC PNL TOTAL CA: CPT

## 2021-10-27 PROCEDURE — 81025 URINE PREGNANCY TEST: CPT

## 2021-10-27 PROCEDURE — 86850 RBC ANTIBODY SCREEN: CPT

## 2021-10-27 PROCEDURE — 88305 TISSUE EXAM BY PATHOLOGIST: CPT

## 2021-10-27 PROCEDURE — 85025 COMPLETE CBC W/AUTO DIFF WBC: CPT

## 2021-10-27 PROCEDURE — 86769 SARS-COV-2 COVID-19 ANTIBODY: CPT

## 2021-10-27 RX ORDER — IBUPROFEN 200 MG
600 TABLET ORAL EVERY 6 HOURS
Refills: 0 | Status: DISCONTINUED | OUTPATIENT
Start: 2021-10-27 | End: 2021-10-27

## 2021-10-27 RX ORDER — POTASSIUM CHLORIDE 20 MEQ
40 PACKET (EA) ORAL ONCE
Refills: 0 | Status: COMPLETED | OUTPATIENT
Start: 2021-10-27 | End: 2021-10-27

## 2021-10-27 RX ADMIN — Medication 600 MILLIGRAM(S): at 12:18

## 2021-10-27 RX ADMIN — Medication 40 MILLIEQUIVALENT(S): at 16:53

## 2021-10-27 RX ADMIN — Medication 600 MILLIGRAM(S): at 12:48

## 2021-10-27 RX ADMIN — PANTOPRAZOLE SODIUM 40 MILLIGRAM(S): 20 TABLET, DELAYED RELEASE ORAL at 12:18

## 2021-10-27 NOTE — PROGRESS NOTE ADULT - PROBLEM SELECTOR PLAN 1
Pt with acute postoperative lower abdominal and pelvic pain which is somatic and neuropathic in nature due to s/p hysteroscopic myomectomy 10/26 POD#1.   Opioid pain recommendations   - dc iv hydromorphone   - Oxycodone 5 mg PO q 4 hours PRN moderate pain. Monitor for sedation/ respiratory depression.   Non-opioid pain recommendations   -  ibuprofen 600mg PO q6h 6 hours prn   - dc toradol   Bowel Regimen  - senna   Mild pain   - Non-pharmacological pain treatment recommendations  - Warm/ Cool packs PRN   - Repositioning, imagery, relaxation, distraction.  - Physical therapy OOB if no contraindications   Recommendations discussed with primary team and RN.

## 2021-10-27 NOTE — DISCHARGE NOTE PROVIDER - NSDCCPCAREPLAN_GEN_ALL_CORE_FT
PRINCIPAL DISCHARGE DIAGNOSIS  Diagnosis: Status post hysteroscopic ablation of endometrium  Assessment and Plan of Treatment:       SECONDARY DISCHARGE DIAGNOSES  Diagnosis: Status post hysteroscopic myomectomy  Assessment and Plan of Treatment:

## 2021-10-27 NOTE — PROGRESS NOTE ADULT - ASSESSMENT
Confidential Drug Utilization Report  Search Terms: Maribeth Wells, 1985Search Date: 10/26/2021 17:33:39 PM  The Drug Utilization Report below displays all of the controlled substance prescriptions, if any, that your patient has filled in the last twelve months. The information displayed on this report is compiled from pharmacy submissions to the Department, and accurately reflects the information as submitted by the pharmacies.    This report was requested by: Celi Hanna | Reference #: [id number]    There are no results for the search terms that you entered.
A/P: 37 yo s/p hysteroscopic myomectomy, D+C and endometrial ablation,  admitted for pain management, currently stable.  - will d/c home  after review of cbc/bmp  - Discharge instructions given, patient verbalized instructions  d/w Dr Buitrago

## 2021-10-27 NOTE — DISCHARGE NOTE PROVIDER - NSDCCPTREATMENT_GEN_ALL_CORE_FT
PRINCIPAL PROCEDURE  Procedure: Dilation and curettage of uterus  Findings and Treatment:       SECONDARY PROCEDURE  Procedure: Hysteroscopic myomectomy  Findings and Treatment:     Procedure: Endometrial ablation  Findings and Treatment:

## 2021-10-27 NOTE — PROGRESS NOTE ADULT - SUBJECTIVE AND OBJECTIVE BOX
Patient seen resting comfortably.  No current complaints.  Denies HA, CP, SOB, dizziness, palpitations, worsening abdominal pain, worsening vaginal bleeding or any other concerns.  + Ambulation, + void without difficulty, + flatus and tolerating regular diet.     Vital Signs Last 24 Hrs  T(C): 36.8 (27 Oct 2021 05:04), Max: 36.8 (26 Oct 2021 18:16)  T(F): 98.2 (27 Oct 2021 05:04), Max: 98.3 (26 Oct 2021 18:16)  HR: 85 (27 Oct 2021 05:04) (75 - 105)  BP: 113/77 (27 Oct 2021 05:04) (108/58 - 133/89)  BP(mean): 106 (26 Oct 2021 16:57) (87 - 106)  RR: 16 (27 Oct 2021 05:04) (12 - 21)  SpO2: 98% (27 Oct 2021 05:04) (96% - 100%)    Gen: A&O x3, NAD  Chest: CTABL  Cardiac: S1+S2+ RRR  Abdomen: Soft, nontender, +BS, nondistended, incisions clean/dry/intact  Gyn: No active bleeding  Extremities: Nontender, no worsening edema, DTRS 2+, venodynes intact bilaterally     cbc/bmp pending       
Source of information: , Chart review  Patient language: English  : n/a    CC:  abdominal pain    This is a 36yFemale patient who presents to the hospital for Patient is a 36y old  Female who presents with a chief complaint of pain management (27 Oct 2021 13:11)   Pt  with subcutaneous leiomyoma of uterus, s/p hysteroscopic myomectomy pod#1.  + abdominal pain. Pt with uncontrolled pain in pacu - pain now well controlled.  No nausea or vomiting  Pt for discharge  home today.     PAIN SCORE:    3/10     SCALE USED: (1-10 NRS)      PAST MEDICAL & SURGICAL HISTORY:  Asthma    Anemia  .  Received iron infusion x 3    S/P tubal ligation  2013    S/P dilation and curettage        FAMILY HISTORY:        SOCIAL HISTORY:  [x ] Denies Smoking, Alcohol, or Drug Use    Allergies    No Known Drug Allergies  Seafood (Short breath)    Intolerances        MEDICATIONS:    MEDICATIONS  (STANDING):  pantoprazole    Tablet 40 milliGRAM(s) Oral daily    MEDICATIONS  (PRN):  ibuprofen  Tablet. 600 milliGRAM(s) Oral every 6 hours PRN Moderate Pain (4 - 6)  metoclopramide Injectable 10 milliGRAM(s) IV Push every 6 hours PRN nausea/vomiting  ondansetron    Tablet 8 milliGRAM(s) Oral every 8 hours PRN Nausea and/or Vomiting  oxyCODONE    IR 5 milliGRAM(s) Oral every 4 hours PRN Moderate Pain (4 - 6)  senna 1 Tablet(s) Oral at bedtime PRN Constipation  simethicone 80 milliGRAM(s) Chew every 6 hours PRN Gas      Vital Signs Last 24 Hrs  T(C): 36.8 (27 Oct 2021 05:04), Max: 36.8 (26 Oct 2021 18:16)  T(F): 98.2 (27 Oct 2021 05:04), Max: 98.3 (26 Oct 2021 18:16)  HR: 85 (27 Oct 2021 05:04) (85 - 105)  BP: 113/77 (27 Oct 2021 05:04) (108/58 - 133/89)  BP(mean): 106 (26 Oct 2021 16:57) (91 - 106)  RR: 16 (27 Oct 2021 05:04) (13 - 21)  SpO2: 98% (27 Oct 2021 05:04) (96% - 98%)    LABS:                          12.5   8.95  )-----------( 375      ( 27 Oct 2021 13:10 )             36.4                 CAPILLARY BLOOD GLUCOSE          Radiology:    Drug Screen:        REVIEW OF SYSTEMS:  CONSTITUTIONAL: No fever or fatigue  RESPIRATORY: No cough, wheezing, chills or hemoptysis; No shortness of breath  CARDIOVASCULAR: No chest pain, palpitations, dizziness, or leg swelling  GASTROINTESTINAL: + abdominal pain   GENITOURINARY: No dysuria, frequency, hematuria, retention or incontinence  MUSCULOSKELETAL: No joint pain or swelling; No muscle, back, or extremity pain, no upper or lower motor strength weakness, no saddle anesthesia, bowel/bladder incontinence, no falls   NEURO: No headaches, No numbness/tingling b/l LE, No weakness  PSYCHIATRIC: No depression, anxiety, mood swings, or difficulty sleeping    PHYSICAL EXAM:  GENERAL:  Alert & Oriented X3, NAD, Good concentration  CHEST/LUNG: Clear to auscultation bilaterally; No rales, rhonchi, wheezing, or rubs  HEART: Regular rate and rhythm; No murmurs, rubs, or gallops  ABDOMEN: + mild distention + tender   EXTREMITIES:  2+ Peripheral Pulses, No cyanosis, or edema  MUSCULOSKELETAL: + decreased rom Motor Strength 5/5 B/L upper and lower extremities; moves all extremities equally against gravity; ROM intact; negative SLR  SKIN: No rashes or lesions    Risk factors associated with adverse outcomes related to opioid treatment  [ ]  Concurrent benzodiazepine use  [ ]  History/ Active substance use or alcohol use disorder  [ ] Psychiatric co-morbidity  [ ] Sleep apnea  [ ] COPD  [ ] BMI> 35  [ ] Liver dysfunction  [ ] Renal dysfunction  [ ] CHF  [ ] Smoker  [ ]  Age > 60 years    [ x]  [facility]  Reviewed and Copied to Chart. See below.    Plan of care and goal oriented pain management treatment options were discussed with patient and /or primary care giver; all questions and concerns were addressed and care was aligned with patient's wishes.    Educated patient on goal oriented pain management treatment options     10-27-21 @ 13:25

## 2021-10-27 NOTE — DISCHARGE NOTE PROVIDER - NSDCACTIVITY_GEN_ALL_CORE
No restrictions/Return to Work/School allowed/Do not drive or operate machinery/Showering allowed/Do not make important decisions/Stairs allowed/No heavy lifting/straining/Follow Instructions Provided by your Surgical Team

## 2021-10-27 NOTE — DISCHARGE NOTE PROVIDER - CARE PROVIDER_API CALL
Charan Huynh  OBSTETRICS AND GYNECOLOGY  [address] [facility], [place] [address]  Phone: [phone]  Fax: [phone]  Established Patient  Follow Up Time: 2 weeks

## 2021-10-27 NOTE — DISCHARGE NOTE PROVIDER - NSDCMRMEDTOKEN_GEN_ALL_CORE_FT
acetaminophen 500 mg oral tablet: 2 tab(s) orally every 6 hours   ciprofloxacin 500 mg oral tablet: 1 tab(s) orally 2 times a day   ibuprofen 800 mg oral tablet: 1 tab(s) orally every 6 hours

## 2021-10-27 NOTE — DISCHARGE NOTE NURSING/CASE MANAGEMENT/SOCIAL WORK - PATIENT PORTAL LINK FT
You can access the FollowMyHealth Patient Portal offered by [facility] by registering at the following website: [email or web address]. By joining [employer] [facility]’s FollowMyHealth portal, you will also be able to view your health information using other applications (apps) compatible with our system.

## 2021-10-27 NOTE — DISCHARGE NOTE PROVIDER - NSDCFUADDINST_GEN_ALL_CORE_FT
Pelvic rest,  no sexual intercourse and nothing in vagina ( ie tampons). Motrin as needed for pain. No strenuous activity and  no heavy lifting.  Follow up with your gynecologist in 1-2wks for your post op visit

## 2021-10-27 NOTE — DISCHARGE NOTE PROVIDER - HOSPITAL COURSE
patient presented to [facility] for scheduled hysteroscopic myomectomy, D+C and endometrial ablation  patient admitted for post op pain management  routine post op care  patient discharged home in stable condition

## 2021-11-10 ENCOUNTER — APPOINTMENT (OUTPATIENT)
Dept: OBGYN | Facility: CLINIC | Age: 36
End: 2021-11-10
Payer: MEDICAID

## 2021-11-10 VITALS — SYSTOLIC BLOOD PRESSURE: 110 MMHG | DIASTOLIC BLOOD PRESSURE: 81 MMHG | WEIGHT: 140 LBS | BODY MASS INDEX: 24.8 KG/M2

## 2021-11-10 PROBLEM — D64.9 ANEMIA, UNSPECIFIED: Chronic | Status: ACTIVE | Noted: 2021-10-19

## 2021-11-10 PROCEDURE — 99213 OFFICE O/P EST LOW 20 MIN: CPT

## 2021-11-10 NOTE — HISTORY OF PRESENT ILLNESS
[Pain is well-controlled] : pain is well-controlled [Fever] : no fever [Chills] : no chills [Nausea] : no nausea [Vomiting] : no vomiting [Clean/Dry/Intact] : clean, dry and intact [Erythema] : not erythematous [None] : no vaginal bleeding [Normal] : normal [Pathology reviewed] : pathology reviewed [de-identified] : pt feels good . No pains  She has some tenderness and discharge , clear .

## 2022-01-27 ENCOUNTER — APPOINTMENT (OUTPATIENT)
Dept: OBGYN | Facility: CLINIC | Age: 37
End: 2022-01-27

## 2022-06-17 NOTE — H&P PST ADULT - NEGATIVE HEMATOLOGY SYMPTOMS
[FreeTextEntry3] : All medical record entries made by the Scribe were at my, Dr. Bear Agrawal, direction and personally dictated by me on 06/16/2022. I have reviewed the chart and agree that the record accurately reflects my personal performance of the history, physical exam, assessment and plan. I have also personally, directed, reviewed and agreed with the chart  [Time Spent: ___ minutes] : I have spent [unfilled] minutes of time on the encounter. no gum bleeding/no nose bleeding/no skin lumps

## 2022-10-09 NOTE — H&P PST ADULT - PROBLEM SELECTOR PLAN 3
Problem: Discharge Planning  Goal: Discharge to home or other facility with appropriate resources  Outcome: Progressing     Problem: Pain  Goal: Verbalizes/displays adequate comfort level or baseline comfort level  Outcome: Progressing
Problem: Discharge Planning  Goal: Discharge to home or other facility with appropriate resources  Outcome: Progressing  Flowsheets (Taken 10/6/2022 2621 by Reynaldo Buenrostro RN)  Discharge to home or other facility with appropriate resources:   Identify barriers to discharge with patient and caregiver   Identify discharge learning needs (meds, wound care, etc)   Refer to discharge planning if patient needs post-hospital services based on physician order or complex needs related to functional status, cognitive ability or social support system
Instruction regarding chlorhexidine soap use is given.  Patient verbalized understanding.  Literature also given

## 2022-10-10 ENCOUNTER — INPATIENT (INPATIENT)
Facility: HOSPITAL | Age: 37
LOS: 2 days | Discharge: HOME | DRG: 102 | End: 2022-10-13
Attending: PSYCHIATRY & NEUROLOGY | Admitting: PSYCHIATRY & NEUROLOGY
Payer: MEDICAID

## 2022-10-10 VITALS
HEART RATE: 78 BPM | TEMPERATURE: 99 F | RESPIRATION RATE: 18 BRPM | HEIGHT: 63 IN | DIASTOLIC BLOOD PRESSURE: 83 MMHG | OXYGEN SATURATION: 96 % | WEIGHT: 138.89 LBS | SYSTOLIC BLOOD PRESSURE: 120 MMHG

## 2022-10-10 DIAGNOSIS — Z98.51 TUBAL LIGATION STATUS: Chronic | ICD-10-CM

## 2022-10-10 DIAGNOSIS — Z98.890 OTHER SPECIFIED POSTPROCEDURAL STATES: Chronic | ICD-10-CM

## 2022-10-11 DIAGNOSIS — R51.9 HEADACHE, UNSPECIFIED: ICD-10-CM

## 2022-10-11 LAB
ALBUMIN SERPL ELPH-MCNC: 4.3 G/DL — SIGNIFICANT CHANGE UP (ref 3.3–5)
ALP SERPL-CCNC: 77 U/L — SIGNIFICANT CHANGE UP (ref 40–120)
ALT FLD-CCNC: 14 U/L — SIGNIFICANT CHANGE UP (ref 10–45)
ANION GAP SERPL CALC-SCNC: 11 MMOL/L — SIGNIFICANT CHANGE UP (ref 5–17)
AST SERPL-CCNC: 12 U/L — SIGNIFICANT CHANGE UP (ref 10–40)
BASOPHILS # BLD AUTO: 0 K/UL — SIGNIFICANT CHANGE UP (ref 0–0.2)
BASOPHILS NFR BLD AUTO: 0 % — SIGNIFICANT CHANGE UP (ref 0–2)
BILIRUB SERPL-MCNC: 0.3 MG/DL — SIGNIFICANT CHANGE UP (ref 0.2–1.2)
BUN SERPL-MCNC: 11 MG/DL — SIGNIFICANT CHANGE UP (ref 7–23)
CALCIUM SERPL-MCNC: 9 MG/DL — SIGNIFICANT CHANGE UP (ref 8.4–10.5)
CHLORIDE SERPL-SCNC: 104 MMOL/L — SIGNIFICANT CHANGE UP (ref 96–108)
CO2 SERPL-SCNC: 22 MMOL/L — SIGNIFICANT CHANGE UP (ref 22–31)
CREAT SERPL-MCNC: 0.57 MG/DL — SIGNIFICANT CHANGE UP (ref 0.5–1.3)
CRP SERPL-MCNC: <3 MG/L — SIGNIFICANT CHANGE UP (ref 0–4)
EGFR: 121 ML/MIN/1.73M2 — SIGNIFICANT CHANGE UP
EOSINOPHIL # BLD AUTO: 0 K/UL — SIGNIFICANT CHANGE UP (ref 0–0.5)
EOSINOPHIL NFR BLD AUTO: 0 % — SIGNIFICANT CHANGE UP (ref 0–6)
GLUCOSE SERPL-MCNC: 166 MG/DL — HIGH (ref 70–99)
HCG UR QL: NEGATIVE — SIGNIFICANT CHANGE UP
HCT VFR BLD CALC: 39 % — SIGNIFICANT CHANGE UP (ref 34.5–45)
HGB BLD-MCNC: 13.1 G/DL — SIGNIFICANT CHANGE UP (ref 11.5–15.5)
IMM GRANULOCYTES NFR BLD AUTO: 0.5 % — SIGNIFICANT CHANGE UP (ref 0–0.9)
LYMPHOCYTES # BLD AUTO: 0.63 K/UL — LOW (ref 1–3.3)
LYMPHOCYTES # BLD AUTO: 8.3 % — LOW (ref 13–44)
MCHC RBC-ENTMCNC: 29 PG — SIGNIFICANT CHANGE UP (ref 27–34)
MCHC RBC-ENTMCNC: 33.6 GM/DL — SIGNIFICANT CHANGE UP (ref 32–36)
MCV RBC AUTO: 86.3 FL — SIGNIFICANT CHANGE UP (ref 80–100)
MONOCYTES # BLD AUTO: 0.05 K/UL — SIGNIFICANT CHANGE UP (ref 0–0.9)
MONOCYTES NFR BLD AUTO: 0.7 % — LOW (ref 2–14)
NEUTROPHILS # BLD AUTO: 6.91 K/UL — SIGNIFICANT CHANGE UP (ref 1.8–7.4)
NEUTROPHILS NFR BLD AUTO: 90.5 % — HIGH (ref 43–77)
NRBC # BLD: 0 /100 WBCS — SIGNIFICANT CHANGE UP (ref 0–0)
PLATELET # BLD AUTO: 405 K/UL — HIGH (ref 150–400)
POTASSIUM SERPL-MCNC: 3.8 MMOL/L — SIGNIFICANT CHANGE UP (ref 3.5–5.3)
POTASSIUM SERPL-SCNC: 3.8 MMOL/L — SIGNIFICANT CHANGE UP (ref 3.5–5.3)
PROT SERPL-MCNC: 7.1 G/DL — SIGNIFICANT CHANGE UP (ref 6–8.3)
RAPID RVP RESULT: SIGNIFICANT CHANGE UP
RBC # BLD: 4.52 M/UL — SIGNIFICANT CHANGE UP (ref 3.8–5.2)
RBC # FLD: 12.2 % — SIGNIFICANT CHANGE UP (ref 10.3–14.5)
SARS-COV-2 RNA SPEC QL NAA+PROBE: SIGNIFICANT CHANGE UP
SODIUM SERPL-SCNC: 137 MMOL/L — SIGNIFICANT CHANGE UP (ref 135–145)
TSH SERPL-MCNC: 0.26 UIU/ML — LOW (ref 0.27–4.2)
VIT B12 SERPL-MCNC: 330 PG/ML — SIGNIFICANT CHANGE UP (ref 232–1245)
WBC # BLD: 7.63 K/UL — SIGNIFICANT CHANGE UP (ref 3.8–10.5)
WBC # FLD AUTO: 7.63 K/UL — SIGNIFICANT CHANGE UP (ref 3.8–10.5)

## 2022-10-11 PROCEDURE — 70498 CT ANGIOGRAPHY NECK: CPT | Mod: 26,MA

## 2022-10-11 PROCEDURE — 99221 1ST HOSP IP/OBS SF/LOW 40: CPT | Mod: GC

## 2022-10-11 PROCEDURE — 70496 CT ANGIOGRAPHY HEAD: CPT | Mod: 26,MA

## 2022-10-11 RX ORDER — DIPHENHYDRAMINE HCL 50 MG
25 CAPSULE ORAL ONCE
Refills: 0 | Status: COMPLETED | OUTPATIENT
Start: 2022-10-11 | End: 2022-10-11

## 2022-10-11 RX ORDER — DEXAMETHASONE 0.5 MG/5ML
10 ELIXIR ORAL ONCE
Refills: 0 | Status: COMPLETED | OUTPATIENT
Start: 2022-10-11 | End: 2022-10-11

## 2022-10-11 RX ORDER — MECLIZINE HCL 12.5 MG
25 TABLET ORAL ONCE
Refills: 0 | Status: COMPLETED | OUTPATIENT
Start: 2022-10-11 | End: 2022-10-11

## 2022-10-11 RX ORDER — ACETAMINOPHEN 500 MG
1000 TABLET ORAL ONCE
Refills: 0 | Status: COMPLETED | OUTPATIENT
Start: 2022-10-11 | End: 2022-10-11

## 2022-10-11 RX ORDER — GABAPENTIN 400 MG/1
300 CAPSULE ORAL ONCE
Refills: 0 | Status: COMPLETED | OUTPATIENT
Start: 2022-10-11 | End: 2022-10-11

## 2022-10-11 RX ORDER — MAGNESIUM SULFATE 500 MG/ML
1 VIAL (ML) INJECTION ONCE
Refills: 0 | Status: DISCONTINUED | OUTPATIENT
Start: 2022-10-11 | End: 2022-10-11

## 2022-10-11 RX ORDER — KETOROLAC TROMETHAMINE 30 MG/ML
15 SYRINGE (ML) INJECTION ONCE
Refills: 0 | Status: DISCONTINUED | OUTPATIENT
Start: 2022-10-11 | End: 2022-10-11

## 2022-10-11 RX ORDER — METOCLOPRAMIDE HCL 10 MG
10 TABLET ORAL ONCE
Refills: 0 | Status: COMPLETED | OUTPATIENT
Start: 2022-10-11 | End: 2022-10-11

## 2022-10-11 RX ORDER — ENOXAPARIN SODIUM 100 MG/ML
40 INJECTION SUBCUTANEOUS EVERY 24 HOURS
Refills: 0 | Status: DISCONTINUED | OUTPATIENT
Start: 2022-10-11 | End: 2022-10-13

## 2022-10-11 RX ORDER — KETOROLAC TROMETHAMINE 30 MG/ML
30 SYRINGE (ML) INJECTION EVERY 8 HOURS
Refills: 0 | Status: DISCONTINUED | OUTPATIENT
Start: 2022-10-11 | End: 2022-10-11

## 2022-10-11 RX ORDER — BACLOFEN 100 %
5 POWDER (GRAM) MISCELLANEOUS ONCE
Refills: 0 | Status: DISCONTINUED | OUTPATIENT
Start: 2022-10-11 | End: 2022-10-11

## 2022-10-11 RX ORDER — ACETAMINOPHEN 500 MG
975 TABLET ORAL ONCE
Refills: 0 | Status: COMPLETED | OUTPATIENT
Start: 2022-10-11 | End: 2022-10-11

## 2022-10-11 RX ORDER — ONDANSETRON 8 MG/1
4 TABLET, FILM COATED ORAL ONCE
Refills: 0 | Status: COMPLETED | OUTPATIENT
Start: 2022-10-11 | End: 2022-10-11

## 2022-10-11 RX ORDER — SODIUM CHLORIDE 9 MG/ML
1000 INJECTION INTRAMUSCULAR; INTRAVENOUS; SUBCUTANEOUS ONCE
Refills: 0 | Status: COMPLETED | OUTPATIENT
Start: 2022-10-11 | End: 2022-10-11

## 2022-10-11 RX ORDER — BACLOFEN 100 %
5 POWDER (GRAM) MISCELLANEOUS EVERY 8 HOURS
Refills: 0 | Status: DISCONTINUED | OUTPATIENT
Start: 2022-10-11 | End: 2022-10-11

## 2022-10-11 RX ORDER — MAGNESIUM SULFATE 500 MG/ML
1 VIAL (ML) INJECTION ONCE
Refills: 0 | Status: COMPLETED | OUTPATIENT
Start: 2022-10-11 | End: 2022-10-11

## 2022-10-11 RX ORDER — MAGNESIUM SULFATE 500 MG/ML
1 VIAL (ML) INJECTION ONCE
Refills: 0 | Status: COMPLETED | OUTPATIENT
Start: 2022-10-11 | End: 2022-10-12

## 2022-10-11 RX ORDER — METOCLOPRAMIDE HCL 10 MG
10 TABLET ORAL EVERY 8 HOURS
Refills: 0 | Status: DISCONTINUED | OUTPATIENT
Start: 2022-10-11 | End: 2022-10-13

## 2022-10-11 RX ORDER — BACLOFEN 100 %
5 POWDER (GRAM) MISCELLANEOUS EVERY 8 HOURS
Refills: 0 | Status: DISCONTINUED | OUTPATIENT
Start: 2022-10-11 | End: 2022-10-12

## 2022-10-11 RX ORDER — ACETAMINOPHEN 500 MG
650 TABLET ORAL EVERY 6 HOURS
Refills: 0 | Status: DISCONTINUED | OUTPATIENT
Start: 2022-10-11 | End: 2022-10-13

## 2022-10-11 RX ADMIN — Medication 15 MILLIGRAM(S): at 02:19

## 2022-10-11 RX ADMIN — Medication 102 MILLIGRAM(S): at 07:51

## 2022-10-11 RX ADMIN — Medication 30 MILLIGRAM(S): at 23:11

## 2022-10-11 RX ADMIN — Medication 15 MILLIGRAM(S): at 08:12

## 2022-10-11 RX ADMIN — Medication 30 MILLIGRAM(S): at 22:15

## 2022-10-11 RX ADMIN — ONDANSETRON 4 MILLIGRAM(S): 8 TABLET, FILM COATED ORAL at 09:57

## 2022-10-11 RX ADMIN — Medication 100 GRAM(S): at 06:02

## 2022-10-11 RX ADMIN — GABAPENTIN 300 MILLIGRAM(S): 400 CAPSULE ORAL at 12:52

## 2022-10-11 RX ADMIN — ENOXAPARIN SODIUM 40 MILLIGRAM(S): 100 INJECTION SUBCUTANEOUS at 22:15

## 2022-10-11 RX ADMIN — Medication 1000 MILLIGRAM(S): at 18:25

## 2022-10-11 RX ADMIN — Medication 25 MILLIGRAM(S): at 09:57

## 2022-10-11 RX ADMIN — SODIUM CHLORIDE 1000 MILLILITER(S): 9 INJECTION INTRAMUSCULAR; INTRAVENOUS; SUBCUTANEOUS at 09:55

## 2022-10-11 RX ADMIN — Medication 10 MILLIGRAM(S): at 02:17

## 2022-10-11 RX ADMIN — Medication 10 MILLIGRAM(S): at 22:15

## 2022-10-11 RX ADMIN — Medication 400 MILLIGRAM(S): at 16:30

## 2022-10-11 RX ADMIN — Medication 5 MILLIGRAM(S): at 16:59

## 2022-10-11 RX ADMIN — Medication 25 MILLIGRAM(S): at 02:04

## 2022-10-11 NOTE — ED ADULT NURSE NOTE - OBJECTIVE STATEMENT
37y Female AOX4 with PMH of asthma presents to the ED c/o headache x1 week. Pt states she feels a "pounding" sensation alternating between left and right side of head, nausea, and dizziness. States the dizziness worsens when turning her head to the side or down. Denies any recent falls, head trauma, syncopal episodes. Endorses new onset of diarrhea for the past 2 days. Has been taking Tylenol with no relief. Denies vomiting, fever/chills, SOB, chest pain. Spontaneous/unlabored respirations, speaking in full sentences. Side rails up, bed in lowest position, safety maintained.

## 2022-10-11 NOTE — ED ADULT NURSE REASSESSMENT NOTE - NS ED NURSE REASSESS COMMENT FT1
MRI consent signed and faxed.
Pt complains of headache. No relief from ofirmev. Pt reports new "heavy feeling in legs". Pt reports decreased sensation predominately in LLE. Full strength in all extremities. Mild decreased sensation in LLE on exam. Neuro MD made aware over the phone [phone]. Neuro to see patient.
Pt given snack. OK per MD Pruitt. Pending dispo.
Pt reports improvement of nausea.
pt given turkey sandwich and ginger ale
report taken from RN marco, pt resting comfortably, neuro (PERRL 3, HICKMAN, bilateral strength intact), vitals stable, no other complaints at this time
Report received from CHRIS Monk. Pt A&Ox4. Pt does not appear to be in any acute distress. Pt c/o nausea and headache. Decadron running. VS documented. Will continue to monitor.

## 2022-10-11 NOTE — ED PROVIDER NOTE - PHYSICAL EXAMINATION
GENERAL: Alert. No acute distress.   EYES: EOMI grossly normal. Anicteric.  HENT: Moist mucous membranes.   RESP: No conversation dyspnea, CTAB  CARDIOVASCULAR: RRR, no m/g/r  ABDOMEN: Soft, nttp, nondistended  EXTREMITIES: ROM grossly normal in all 4 extremities. No deformities  SKIN: warm and dry  NEUROLOGIC: Alert and oriented x3, CN II to XII intact beside horizontal nystagmus. 5/5 strength in all 4 extremities, finger to nose intact bilaterally, heel to shin intact bilaterally. Sensation intact  PSYCHIATRIC: Cooperative. Appropriate mood and affect

## 2022-10-11 NOTE — PATIENT PROFILE ADULT - FALL HARM RISK - HARM RISK INTERVENTIONS

## 2022-10-11 NOTE — CHART NOTE - NSCHARTNOTEFT_GEN_A_CORE
Neurology consulted for gradual onset of headaches, oscillating between left and right occipital region, onset one week ago, associated with dizziness (room-spinning) and nausea. Non-focal neurological examination, per ED provider. Patient already given ketorolac 15mg x1, magnesium sulfate 1g x1, metoclopramide 10mg x1, and meclizine 25mg x1, reportedly without relief. Patient will be seen by Neurology in the ED. In the meantime, would recommend the following:    Abortive therapies:  [] first line: ketorolac 30mg IV q8h PRN, metoclopramide 10mg q8h PRN, [id number] IV q8h PRN, IV hydration, Mg 2g IV x1 -- to be given together.  [] second line: repeat first line after 8 hours, in addition to valproic acid 500mg IV PRN x1.      -  Leonardo Villalobos MD  PGY-3 Neurology   Spectra [phone] Neurology consulted for gradual onset of headaches, oscillating between left and right occipital region, onset one week ago, associated with dizziness (room-spinning) and nausea, per ED provider. Non-focal neurological examination, per ED provider. CT head without acute pathology. Patient already given ketorolac 15mg x1, magnesium sulfate 1g x1, metoclopramide 10mg x1, and meclizine 25mg x1, reportedly without relief. Patient will be seen by Neurology in the ED. In the meantime, would recommend the following:    Abortive therapies:  [] first line: ketorolac 30mg IV q8h PRN, metoclopramide 10mg q8h PRN, [id number] IV q8h PRN, IV hydration, Mg 2g IV x1 -- to be given together.      -  Leonardo Villalobos MD  PGY-3 Neurology   Spectra [phone]

## 2022-10-11 NOTE — H&P ADULT - ASSESSMENT
37 year old right-handed woman with past medical history asthma, previous tension type headaches presents for gradual onset worsening posterior headaches (left worse than right) accompanied by dizziness everyday for the past week. Patient has pain-limited weakness due to her IV of the left deltoid/bicep/tricep 4+/5, otherwise no focal neurological deficits. She has tenderness with palpation of the occipital groove, left greater than right.    Impression: Occipital neuralgia    Plan:  []   [] TSH, lipid profile, CBC, BMP, B12  [] neurochecks Q4H, vitals Q4H 37 year old right-handed woman with past medical history asthma, previous tension type headaches presents for gradual onset worsening posterior headaches (left worse than right) accompanied by dizziness everyday for the past week. Patient has pain-limited weakness due to her IV of the left deltoid/bicep/tricep 4+/5, and unidirectional left-beating nystagmus. She has tenderness with palpation of the occipital groove, left greater than right. No meningismus. RVP and COVID neg. CT head and CT angio head/neck ruled out acute intracranial pathology. Awaiting labwork, remains hemodynamically stable.    Impression: Possibly occipital neuralgia; rule out malignant pathology including PRES syndrome    Plan:  [] MRI brain with and without contrast under anesthesia  [] MRI cervical spine w/o contrast under anesthesia  [] Trial baclofen 5mg TID PRN   [] Standing Toradol Q8H, Reglan 10mg Q8H, and Benadryl 25mg Q8H, Magnesium 1g PRN  [] For severe pain, patient can take opiates if needed per Dr. Fortune  [] Will consider occipital nerve block in AM after discussing with attending  [] TSH, lipid profile, A1C, CBC, BMP, B12, vitamin D, ESR, CRP  [] neurochecks Q4H, vitals Q4H  [] PT/OT    Pt to follow up with headache specialist, Dr. Jh Fortune. [phone] upon discharge. 37 year old right-handed woman with past medical history asthma, previous tension type headaches presents for gradual onset worsening posterior headaches (left worse than right) accompanied by dizziness everyday for the past week. Patient has pain-limited weakness due to her IV of the left deltoid/bicep/tricep 4+/5, and unidirectional left-beating nystagmus. She has tenderness with palpation of the occipital groove, left greater than right. No meningismus. RVP and COVID neg. CT head and CT angio head/neck ruled out acute intracranial pathology. Awaiting labwork, remains hemodynamically stable.    Impression: Intractable headaches, likely occipital neuralgia, rule out malignant pathology including infectious/inflammatory, PRES, neurovascular etiologies     Plan:  [] MRI brain with and without contrast under anesthesia  [] MRI cervical spine w/o contrast under anesthesia  [] Trial baclofen 5mg TID PRN   [] Standing Toradol Q8H, Reglan 10mg Q8H, and Benadryl 25mg Q8H, Magnesium 1g PRN  [] For severe pain, patient can take opiates if needed per Dr. Fortune  [] Will consider occipital nerve block in AM after discussing with attending  [] TSH, lipid profile, A1C, CBC, BMP, B12, vitamin D, ESR, CRP  [] neurochecks Q4H, vitals Q4H  [] PT/OT    Pt to follow up with headache specialist, Dr. Jh Fortune. [phone] upon discharge.

## 2022-10-11 NOTE — H&P ADULT - NSHPLABSRESULTS_GEN_ALL_CORE
PENDING LABS    IMAGING:  CT Head No Cont (10.11.22 @ 03:39)  IMPRESSION:  CT brain: No acute intracranial hemorrhage, mass effect, midline shift.  CTA neck and brain: No vessel occlusion or significant stenosis.

## 2022-10-11 NOTE — ED PROVIDER NOTE - ATTENDING CONTRIBUTION TO CARE
Afebrile. Awake and Alert. Neck supple. Lungs CTA. Heart RRR. Abdomen soft NTND. CN II-XII grossly intact. Moves all extremities without lateralization.    Headache gradual onset with non-focal neuro exam  Vertigo-positional likely BPPV  Supportive care and reassess Afebrile. Awake and Alert. Neck supple. Lungs CTA. Heart RRR. Abdomen soft NTND. CN II-XII grossly intact. Moves all extremities without lateralization. No occipital groove TTP. No mid-line CS TTP.    Headache gradual onset with non-focal neuro exam  Vertigo-positional likely BPPV  Supportive care and reassess

## 2022-10-11 NOTE — ED ADULT NURSE NOTE - NSIMPLEMENTINTERV_GEN_ALL_ED
Implemented All Universal Safety Interventions:  [place] to call system. Call bell, personal items and telephone within reach. Instruct patient to call for assistance. Room bathroom lighting operational. Non-slip footwear when patient is off stretcher. Physically safe environment: no spills, clutter or unnecessary equipment. Stretcher in lowest position, wheels locked, appropriate side rails in place.

## 2022-10-11 NOTE — ED PROVIDER NOTE - OBJECTIVE STATEMENT
36 yo pt with PmHx of asthma, no previous headache history, presents for gradual onset worsening alternating left sided and right sided headache with dizziness for the past week. Dizziness is described as room spinning sensation, worsen with looking down and with turning head. Tried tylenol and motrin, origianlly has some response, but now pain no longer response to this. Not light sensitive nor sound sensitive. Also endorsed nausea and 3 episode of watery diarrhea yesterday. No recent travel, no drinking well water, no eating spoiled food.

## 2022-10-11 NOTE — ED PROVIDER NOTE - NS ED ROS FT
CONSTITUTIONAL: No fever or chill  HEENT: Denies changes in vision and hearing.  RESPIRATORY: Denies SOB and cough.  CV: Denies chest pain  GI: Denies abdominal pain, vomiting. (+) nausea, diarrhea  : Denies dysuria and urinary frequency  MSK: Denies myalgia and joint pain  SKIN: Denies rash   NEUROLOGICAL: (+) headache and room spinning

## 2022-10-11 NOTE — H&P ADULT - ATTENDING COMMENTS
Ms. Wells is a 37 year old right-handed woman with pmhx significant for asthma,  tension type headaches who presents with 1 week of subacute onset  of worsening posterior headaches which radiates unilaterally intermittently left and right and is accompanied by dizziness and nausea.  She was initially noted to have nystagmus although that is not clearly present on examination at 1015 on 10/12.  She was brought to ED, had RVP and COVID testing neg, CT head and CT angio head/neck ruled out acute intracranial pathology.  She has had several doses of antiemetic, ketorolac and steroid without change in her head pain but possibly with diminishing of nausea.  On exam pt Alert and oriented.  No facial droop, tongue midline.  No noted nystagmus.  + pupillary relfex equal and wnl.  + corneals.  No noted dysmetria or tremor,.  No tinnitus or hearing loss.  Moderate occipital tenderness to palpation.  No radiation of pain.  No noted weakness in 4/4 [id number].  No sensory change.    Impression: Intractable headaches, r/o occipital neuralgia, r/o brain stem lesion.    Plan:  [] MRI brain with and without contrast under anesthesia  [] MRI cervical spine w/o contrast under anesthesia  [] baclofen 10mg TID   [] Standing Toradol Q8H, Reglan 10mg Q8H, and Benadryl 25mg Q8H, Magnesium 1g PRN  [] For severe pain, patient to try hydroxyzine 25-50mg q8n  [] Will consider occipital nerve block in AM after MRI

## 2022-10-11 NOTE — ED PROVIDER NOTE - PROGRESS NOTE DETAILS
states still has headache, now posterior states still has headache, now posterior. Give mgnesium and tylenol. IV was semi out, slow drip in. pt likely did not received full dose medication. CT imaging unremarkable. Discussed LP, pt declined. Maria G Vasquez, Attending Physician: Pt signed out to me by Dr. Boyer pending resolution of headache. No fevers to suggest meningitis. No OCP use. Infrequent HA. Neuro consulted, will see patient.

## 2022-10-11 NOTE — H&P ADULT - HISTORY OF PRESENT ILLNESS
37 year old right-handed woman with past medical history asthma, previous tension type headaches presents for gradual onset worsening posterior headaches (left worse than right) accompanied by dizziness everyday for the past week. She denies headache onset that reaches maximal intensity in less than 60 seconds (thunderclap). Dizziness is described as room spinning sensation, worsens with head movement. The patient reports that she normally takes Tylenol and Motrin for her headaches, which has not provided much relief. She denies vomiting, photo/phonophobia, pain behind the eye, lacrimation, erythema, neck pain/trauma, fall. No preceding aura. She has had intermittent nausea, blurry vision, and "hot flashes" that are associated with these headaches. She denies fevers, chills, cough, chest pain, SOB, recent infections, but had 3 episodes of watery diarrhea on 10/10 of unknown etiology. Patient states she cannot tolerate an MRI without anesthesia/sedation.

## 2022-10-11 NOTE — PATIENT PROFILE ADULT - FUNCTIONAL ASSESSMENT - BASIC MOBILITY 6.
4-calculated by average/Not able to assess (calculate score using [facility] averaging method)

## 2022-10-11 NOTE — ED PROVIDER NOTE - CLINICAL SUMMARY MEDICAL DECISION MAKING FREE TEXT BOX
38 yo with PMHx of asthma, no headache history, presents for gradual onset alternating left sided v.s. right sided headache, with + nausea, - light or sound sensitivity. Also endorsed room spinning sensation and diarrhea for 2 days. Trial reglan and tradol and meclizine. Reassess, plan to d/c home

## 2022-10-11 NOTE — H&P ADULT - NSHPREVIEWOFSYSTEMS_GEN_ALL_CORE
+ nausea, + blurry vision  Patient denies trauma, LOC, fever, chills, HA, vision changes, tinnitus, dysarthria, dysphagia, chest pain, palpitations, SOB, nausea, vomiting, diarrhea, dysuria and incontinence.

## 2022-10-11 NOTE — H&P ADULT - NSHPPHYSICALEXAM_GEN_ALL_CORE
General: in no acute distress, non-diaphoretic  Head: normocephalic, atraumatic  Eyes: non-icteric sclera, no conjunctival injection  Abdomen: soft, non-tender  Extremities: no lower extremity edema, no deformities    Neurologic:    - Mental Status: Alert, awake, oriented to person, place, and time; no dysarthria; speech is fluent with intact naming, repetition, and comprehension; follows crossed 3-step commands; able to spell WORLD backwards    - Cranial Nerves: visual fields are full to confrontation; pupils are equal, round, and reactive to light; extraocular movements are intact, unidirectional left-beating nystagmus noted with left lateral gaze; facial sensation is intact in the V1-V3 distribution bilaterally; face is symmetric with normal eye closure and smile; hearing is intact to conversation; uvula is midline and soft palate rises symmetrically; head turning intact; tongue protrudes in the midline.    -Motor/Strength Testing:                                 Right           Left  Deltoid                     5                 5  Biceps                      5                 5  Triceps                     5                 5  Hip Flex                   5                  5  Knee Flex                 5                  5  Knee Ext	      5                  5  Dorsiflex                  5                  5  Plantarflex               5                  5    - There is no pronator drift. Normal muscle bulk and tone throughout.    - Reflexes:   Bicep (C5/C6):                  R 2+ --- L 2+   Brachioradialis (C5/C6) :   R 2+ --- L 2+   Patella (L3/L4) :                 R 2+ --- L 2+   Ankle (S1) :                       R 2+ --- L 2+     - Plantar responses down bilaterally.    - Sensory: Intact throughout to light touch and pinprick throughout. JPS intact bilaterally.   - Coordination: Finger-nose-finger intact without dysmetria.    - Gait: Normal steps, base, arm swing, and turning. Romberg testing is negative. General: in no acute distress, non-diaphoretic  Head: normocephalic, atraumatic, no neck rigidity  Eyes: non-icteric sclera, no conjunctival injection  Abdomen: soft, non-tender  Extremities: no lower extremity edema, no deformities  Spine: Tenderness at occipital groove upon palpation left > right.     Neurologic:    - Mental Status: Alert, awake, oriented to person, place, and time; speech is fluent with intact naming, repetition, and comprehension; follows crossed 3-step commands; able to spell WORLD backwards    - Cranial Nerves: visual fields are full to confrontation; pupils are equal, round, and reactive to light; extraocular movements are intact, orthophoric gaze, unidirectional left-beating nystagmus noted with left and right lateral gaze, no skew; facial sensation is intact in the V1-V3 distribution bilaterally; face is symmetric with normal eye closure and smile; hearing is intact to conversation; uvula is midline and soft palate rises symmetrically; head turning intact; tongue protrudes in the midline.    -Motor/Strength Testing:                                 Right           Left  Deltoid                     5                 4+  Biceps                      5                 4+  Triceps                     5                 4+  Hip Flex                   5                  5  Knee Flex                 5                  5  Knee Ext	      5                  5  Dorsiflex                  5                  5  Plantarflex               5                  5    - Slightly weaker  strength on left  - There is no pronator drift. Normal muscle bulk and tone throughout.    - Reflexes:   Bicep (C5/C6):                  R 2+ --- L 2+   Brachioradialis (C5/C6) :   R 2+ --- L 2+   Patella (L3/L4) :                 R 2+ --- L 2+   Ankle (S1) :                       R 2+ --- L 2+     - Plantar responses down bilaterally.    - Sensory: Intact throughout to light touch and pinprick throughout. JPS intact bilaterally.   - Coordination: Finger-nose-finger intact without dysmetria.    - Gait: Normal steps, base, arm swing, and turning. Romberg testing is negative.

## 2022-10-12 ENCOUNTER — TRANSCRIPTION ENCOUNTER (OUTPATIENT)
Age: 37
End: 2022-10-12

## 2022-10-12 LAB
A1C WITH ESTIMATED AVERAGE GLUCOSE RESULT: 5.4 % — SIGNIFICANT CHANGE UP (ref 4–5.6)
CHOLEST SERPL-MCNC: 165 MG/DL — SIGNIFICANT CHANGE UP
ESTIMATED AVERAGE GLUCOSE: 108 MG/DL — SIGNIFICANT CHANGE UP (ref 68–114)
HDLC SERPL-MCNC: 35 MG/DL — LOW
LIPID PNL WITH DIRECT LDL SERPL: 102 MG/DL — HIGH
NON HDL CHOLESTEROL: 130 MG/DL — HIGH
TRIGL SERPL-MCNC: 140 MG/DL — SIGNIFICANT CHANGE UP
VIT D25+D1,25 OH+D1,25 PNL SERPL-MCNC: 113 PG/ML — HIGH (ref 19.9–79.3)

## 2022-10-12 RX ORDER — HYDROXYZINE HCL 10 MG
25 TABLET ORAL EVERY 8 HOURS
Refills: 0 | Status: DISCONTINUED | OUTPATIENT
Start: 2022-10-12 | End: 2022-10-13

## 2022-10-12 RX ORDER — INFLUENZA VIRUS VACCINE 15; 15; 15; 15 UG/.5ML; UG/.5ML; UG/.5ML; UG/.5ML
0.5 SUSPENSION INTRAMUSCULAR ONCE
Refills: 0 | Status: DISCONTINUED | OUTPATIENT
Start: 2022-10-12 | End: 2022-10-13

## 2022-10-12 RX ORDER — BACLOFEN 100 %
10 POWDER (GRAM) MISCELLANEOUS EVERY 8 HOURS
Refills: 0 | Status: DISCONTINUED | OUTPATIENT
Start: 2022-10-12 | End: 2022-10-13

## 2022-10-12 RX ORDER — PREGABALIN 225 MG/1
1000 CAPSULE ORAL DAILY
Refills: 0 | Status: DISCONTINUED | OUTPATIENT
Start: 2022-10-12 | End: 2022-10-13

## 2022-10-12 RX ADMIN — Medication 10 MILLIGRAM(S): at 04:43

## 2022-10-12 RX ADMIN — Medication 25 MILLIGRAM(S): at 21:36

## 2022-10-12 RX ADMIN — Medication 25 MILLIGRAM(S): at 13:14

## 2022-10-12 RX ADMIN — Medication 10 MILLIGRAM(S): at 13:14

## 2022-10-12 RX ADMIN — PREGABALIN 1000 MICROGRAM(S): 225 CAPSULE ORAL at 13:14

## 2022-10-12 RX ADMIN — Medication 100 GRAM(S): at 04:49

## 2022-10-12 RX ADMIN — Medication 10 MILLIGRAM(S): at 21:36

## 2022-10-12 NOTE — PHYSICAL THERAPY INITIAL EVALUATION ADULT - PERTINENT HX OF CURRENT PROBLEM, REHAB EVAL
37 year old right-handed woman with past medical history asthma, previous tension type headaches presents for gradual onset worsening posterior headaches (left worse than right) accompanied by dizziness everyday for the past week. She denies headache onset that reaches maximal intensity in less than 60 seconds (thunderclap). Dizziness is described as room spinning sensation, worsens with head movement. The patient reports that she normally takes Tylenol and Motrin for her headaches, which has not provided much relief. She denies vomiting, photo/phonophobia, pain behind the eye, lacrimation, erythema, neck pain/trauma, fall. No preceding aura. She has had intermittent nausea, blurry vision, and "hot flashes" that are associated with these headaches. She denies fevers, chills, cough, chest pain, SOB, recent infections, but had 3 episodes of watery diarrhea on 10/10 of unknown etiology. Patient states she cannot tolerate an MRI without anesthesia/sedation. CT angio head: CT brain: No acute intracranial hemorrhage, mass effect, midline shift. CTA neck and brain: No vessel occlusion or significant stenosis.

## 2022-10-12 NOTE — DISCHARGE NOTE PROVIDER - HOSPITAL COURSE
37 year old right-handed woman with past medical history asthma, previous tension type headaches presents for gradual onset worsening posterior headaches (left worse than right) accompanied by dizziness everyday for the past week. She denies headache onset that reaches maximal intensity in less than 60 seconds (thunderclap). Dizziness is described as room spinning sensation, worsens with head movement. The patient reports that she normally takes Tylenol and Motrin for her headaches, which has not provided much relief. She denies vomiting, photo/phonophobia, pain behind the eye, lacrimation, erythema, neck pain/trauma, fall. No preceding aura. She has had intermittent nausea, blurry vision, and "hot flashes" that are associated with these headaches. She denies fevers, chills, cough, chest pain, SOB, recent infections, but had 3 episodes of watery diarrhea on 10/10 of unknown etiology. Patient states she cannot tolerate an MRI without anesthesia/sedation for which she was admitted. While in ED, CT head and CTA head/neck obtained showing no acute intracranial hemorrhage, mass effect, midline shift. No vessel occlusion or significant stenosis. Received tylenol, decadron, benadryl, gabapentin, toradol, mag IV, reglan, zofran, IVF. 37 year old right-handed woman with past medical history asthma, previous tension type headaches presents for gradual onset worsening posterior headaches (left worse than right) accompanied by dizziness everyday for the past week. She denies headache onset that reaches maximal intensity in less than 60 seconds (thunderclap). Dizziness is described as room spinning sensation, worsens with head movement. The patient reports that she normally takes Tylenol and Motrin for her headaches, which has not provided much relief. She denies vomiting, photo/phonophobia, pain behind the eye, lacrimation, erythema, neck pain/trauma, fall. No preceding aura. She has had intermittent nausea, blurry vision, and "hot flashes" that are associated with these headaches. She denies fevers, chills, cough, chest pain, SOB, recent infections, but had 3 episodes of watery diarrhea on 10/10 of unknown etiology. Patient states she cannot tolerate an MRI without anesthesia/sedation for which she was admitted. While in ED, CT head and CTA head/neck obtained showing no acute intracranial hemorrhage, mass effect, midline shift. No vessel occlusion or significant stenosis. Received tylenol, decadron, benadryl, gabapentin, toradol, mag IV, reglan, zofran, IVF. Admitted for MRI brain w/w/o con and c spine with anesthesia (due to claustrophobia). Showed chiari 1 malformation with no other acute findings. Discussed w/ attending, given patient clinically stable will have outpatient follow up. Had improvement with hydroxyzine for which will discharge with. Will follow up with Dr Fortune in outpatient setting to determine if warrants occipital nerve block.

## 2022-10-12 NOTE — PHYSICAL THERAPY INITIAL EVALUATION ADULT - ADDITIONAL COMMENTS
Prior to admission pt reports being independent of all ADL's & functional mobility without AD. Pt resides in apt with spouse and children (17 and 10 y/o), 1 flight of stairs to enter. (-) glasses. (+) RHD. (+) driving. (+) works full time in sales.

## 2022-10-12 NOTE — MEDICAL STUDENT PROGRESS NOTE(EDUCATION) - NS MD HP STUD ASPLAN PLAN FT
Plan:    10/11: Nurse called stating that patient was reporting sensory deficit to light touch on legs during neurological assessment. Strength unchanged. Complaining of severe headache. Instructed nurse to give patient her toradol and reglan.        [] MRI brain w/ and w/out contrast under anesthesia (patient cannot tolerate the procedure w/out anesthesia (planned for 10/12)  [] MRI cervical spine w/out contrast under anesthesia  [] Trial baclofen 5mg TID PRN  [] Standing Toradol Q8H, Reglan 10 mg Q8H, and Benadryl 25 mg Q8H, Magnesium 1g PRN  [] For severe pain, patient can take opiates if needed per Dr. Fortune  [] Will consider occipital nerve block in AM (10/12) after discussing with attending  [] TSH, lipid profile, A1C, CBC, BMP, B12, vitamin D, ESR, CRP in AM (10/12)  [] Neurochecks Q4H, vitals Q4H  [] PT/OT consideration 37 year old right-handed woman with past medical history asthma, previous tension type headaches presents for gradual onset worsening posterior headaches (left worse than right) accompanied by dizziness everyday for the past week. Patient has pain-limited weakness due to her IV of the left deltoid/bicep/tricep 4+/5, and unidirectional left-beating nystagmus. She has tenderness with palpation of the occipital groove, left greater than right. No meningismus. RVP and COVID neg. CT head and CT angio head/neck ruled out acute intracranial pathology. Awaiting labwork, remains hemodynamically stable.    Impression: Intractable headaches, likely occipital neuralgia, rule out malignant pathology including infectious/inflammatory, PRES, neurovascular etiologies     Plan:  [] MRI brain with and without contrast under anesthesia tentatively tomorrow 10/13  [] MRI cervical spine w/o contrast under anesthesia tentatively tomorrow 10/13  [] Inc baclofen to 10mg TID PRN   [] Adding hydroxyzine 25mg q8hrs  [] Toradol Q8H, Reglan 10mg Q8H, Magnesium 1g PRN  [] For severe pain, patient can take opiates if needed per Dr. Fortune  [] Will consider occipital nerve block tomorrow   [] TSH, lipid profile, A1C, CBC, BMP, B12, vitamin D, ESR, CRP  [] neurochecks Q4H, vitals Q4H  [] PT/OT    Pt to follow up with headache specialist, Dr. Jh Fortune. [phone] upon discharge.

## 2022-10-12 NOTE — DISCHARGE NOTE PROVIDER - NSDCMRMEDTOKEN_GEN_ALL_CORE_FT
acetaminophen 500 mg oral tablet: 2 tab(s) orally every 6 hours    cyanocobalamin 1000 mcg oral tablet: 1 tab(s) orally once a day  gabapentin 100 mg oral capsule: 1 cap(s) orally 3 times a day MDD:300mg  hydrOXYzine hydrochloride 25 mg oral tablet: 1 tab(s) orally every 8 hours MDD:75mg  naproxen 500 mg oral delayed release tablet: 1 tab(s) orally 2 times a day

## 2022-10-12 NOTE — DISCHARGE NOTE PROVIDER - CARE PROVIDER_API CALL
Jh Fortune (MD)  Neurology; Pain Medicine; Psychiatry  [address] [facility], [facility] [address]  [place] [address]  Phone: [phone]  Fax: [phone]  Follow Up Time: 1 month

## 2022-10-12 NOTE — MEDICAL STUDENT PROGRESS NOTE(EDUCATION) - SUBJECTIVE AND OBJECTIVE BOX
Neurology Progress Note    SUBJECTIVE/OBJECTIVE/INTERVAL EVENTS: Patient seen and examined at bedside w/ neuro attending and team.     INTERVAL HISTORY:    REVIEW OF SYSTEMS: Otherwise denies fever, chills, headaches, vision changes, blurry vision, double vision, nausea, vomiting, hearing change, focal weakness, focal numbness, parasthesias, bowel/ bladder incontinence.  Few questions of a 10-system ROS was performed and is negative except for those items noted above and/or in the HPI.    VITALS & EXAMINATION:  Vital Signs Last 24 Hrs  T(C): 36.6 (12 Oct 2022 04:28), Max: 36.9 (11 Oct 2022 21:20)  T(F): 97.9 (12 Oct 2022 04:28), Max: 98.4 (11 Oct 2022 21:20)  HR: 71 (12 Oct 2022 04:28) (71 - 90)  BP: 106/69 (12 Oct 2022 04:28) (106/69 - 117/80)  BP(mean): --  RR: 16 (12 Oct 2022 04:28) (16 - 17)  SpO2: 94% (12 Oct 2022 04:28) (94% - 99%)    Parameters below as of 12 Oct 2022 04:28  Patient On (Oxygen Delivery Method): room air    General:  Constitutional: Female, appears stated age   Head: Normocephalic; Eyes: clear sclera;   Extremities: No cyanosis; Skin: No veronika edema of LE  Resp: breathing comfortably     Neurological (>12):  MS: Awake, alert.  Follows commands. Attends to examiner  Language: Speech is hypophonic, clear, fluent, good repetition,  comprehension, registration of words.  CNs: PERRL (R 3mm, L 3mm). VFF. EOMI. V1-3 intact LT, No facial asymmetry b/l. Hearing grossly normal b/l. Tongue midline.     Motor - Normal bulk and tone throughout. No pronator drift.    L/R         Deltoid  5/5    Biceps   5/5      Triceps  5/5         Wrist Extension 5/5   Wrist Flexion  5/5      5/5   L/R         Hip Flexion  5/5    Hip Extension  5/5  Knee Extension  5/5  Dorsiflexion  5/5      Plantar Flexion 5/5     Sensation: Intact to LT b/l. Cortical: Extinction on DSS (neglect): none  Reflexes L/R:  Biceps(C5) 2/2  BR(C6) 2/2   Triceps(C7)  2/2 Patellar(L4)   2/2   Toes: mute  Coordination: No dysmetria to FTN b/l UE  Gait: Normal Romberg. No postural instability. Normal stance.    LABORATORY:  CBC                       13.1   7.63  )-----------( 405      ( 11 Oct 2022 16:58 )             39.0     Chem 10-11    137  |  104  |  11  ----------------------------<  166<H>  3.8   |  22  |  0.57    Ca    9.0      11 Oct 2022 16:58    TPro  7.1  /  Alb  4.3  /  TBili  0.3  /  DBili  x   /  AST  12  /  ALT  14  /  AlkPhos  77  10-11    LFTs LIVER FUNCTIONS - ( 11 Oct 2022 16:58 )  Alb: 4.3 g/dL / Pro: 7.1 g/dL / ALK PHOS: 77 U/L / ALT: 14 U/L / AST: 12 U/L / GGT: x           Coagulopathy   Lipid Panel 10-11 Chol 165 LDL -- HDL 35<L> Trig 140  A1c   Cardiac enzymes     U/A   CSF  Immunological  Other    STUDIES & IMAGING: (EEG, CT, MR, U/S, TTE/DUNG): Neurology Progress Note    SUBJECTIVE/OBJECTIVE/INTERVAL EVENTS: Patient seen and examined at bedside w/ neuro attending and team. Reports headache still persistent 9/10 posterior occipital region, nonradiating down to neck. Has tenderness around neck. Sharp stabbing pain. Worsened with movement of head to R/L. No visual symptoms. Some intermittent nausea. No CP, SOB, numbness/tingling/weakness of extremities except while getting tylenol yesterday felt numbness of distal legs from ankles down. Able to ambulate.      Pending MRI w/ anesthesia    VITALS & EXAMINATION:  Vital Signs Last 24 Hrs  T(C): 36.6 (12 Oct 2022 04:28), Max: 36.9 (11 Oct 2022 21:20)  T(F): 97.9 (12 Oct 2022 04:28), Max: 98.4 (11 Oct 2022 21:20)  HR: 71 (12 Oct 2022 04:28) (71 - 90)  BP: 106/69 (12 Oct 2022 04:28) (106/69 - 117/80)  BP(mean): --  RR: 16 (12 Oct 2022 04:28) (16 - 17)  SpO2: 94% (12 Oct 2022 04:28) (94% - 99%)    Parameters below as of 12 Oct 2022 04:28  Patient On (Oxygen Delivery Method): room air    General:  Constitutional: Female, appears stated age   Head: Normocephalic; Eyes: clear sclera;   Extremities: No cyanosis; Skin: No veronika edema of LE  Resp: breathing comfortably     Neurological (>12):  MS: Awake, alert.  Follows commands. Attends to examiner  Language: Speech is clear, fluent, good repetition,  comprehension, registration of words.  CNs: PERRL (R 3mm, L 3mm). VFF. EOMI. V1-3 intact LT, No facial asymmetry b/l. Hearing grossly normal b/l. Tongue midline.   Significant tenderness in occipital region L > R to deep palpation. No pain to tenderness for TMJ eval.     Motor - Normal bulk and tone throughout. No pronator drift.    L/R         Deltoid  5/5    Biceps   5/5      Triceps  5/5         Wrist Extension 5/5   Wrist Flexion  5/5      5/5   L/R         Hip Flexion  5/5    Hip Extension  5/5  Knee Extension  5/5  Dorsiflexion  5/5      Plantar Flexion 5/5     Sensation: Intact to LT b/l. Cortical: Extinction on DSS (neglect): none  Reflexes L/R:  Biceps(C5) 2/2  BR(C6) 2/2   Triceps(C7)  2/2 Patellar(L4)   2/2   Toes: mute  Coordination: No dysmetria to FTN b/l UE  Gait: Normal Romberg. No postural instability. Normal stance.    LABORATORY:  CBC                       13.1   7.63  )-----------( 405      ( 11 Oct 2022 16:58 )             39.0     Chem 10-11    137  |  104  |  11  ----------------------------<  166<H>  3.8   |  22  |  0.57    Ca    9.0      11 Oct 2022 16:58    TPro  7.1  /  Alb  4.3  /  TBili  0.3  /  DBili  x   /  AST  12  /  ALT  14  /  AlkPhos  77  10-11    LFTs LIVER FUNCTIONS - ( 11 Oct 2022 16:58 )  Alb: 4.3 g/dL / Pro: 7.1 g/dL / ALK PHOS: 77 U/L / ALT: 14 U/L / AST: 12 U/L / GGT: x           Coagulopathy   Lipid Panel 10-11 Chol 165 LDL -- HDL 35<L> Trig 140  A1c   Cardiac enzymes     U/A   CSF  Immunological  Other    STUDIES & IMAGING: (EEG, CT, MR, U/S, TTE/DUNG):    < from: CT Angio Neck w/ IV Cont (10.11.22 @ 03:38) >    ACC: [id number] EXAM:  CT ANGIO BRAIN (W) IC                        ACC: [id number] EXAM:  CT ANGIO NECK (W) IC                        ACC: [id number] EXAM:  CT BRAIN                          PROCEDURE DATE:  10/11/2022          INTERPRETATION:  CLINICAL INDICATION: Headache and vertigo.    TECHNIQUE:  Noncontrast CT of the head was performed followed by CT angiography of   the neck and brain.  Two-dimensional sagittal and coronal reformats and maximum intensity   projection reformats were created.  Intravenous Contrast: 70 mL Omnipaque-350 was administered. 0 mL was   discarded.    COMPARISON: None.    FINDINGS:    CT BRAIN:    No acute intracranial hemorrhage, mass effect, or midline shift. No   abnormal extra-axial collection. No hydrocephalus.    Left maxillary sinus mucous retention cyst versus polyp. The   tympanomastoid cavities are clear. The orbits are unremarkable. Calvarium   is intact.    CTA NECK AND BRAIN:  GREAT VESSELS: Within normal limits.  COMMON CAROTID ARTERIES: Within normal limits.  CAROTID BIFURCATIONS: Within normal limits.  INTERNAL CAROTID ARTERIES: Patent without stenosis.    VERTEBRAL ARTERIES: Patent without stenosis.    ANTERIOR CIRCULATION: Patent without stenosis or aneurysm.  POSTERIOR CIRCULATION: Patent without stenosis or aneurysm.    VISUALIZED LUNGS: Within normal limits.  NECK SOFT TISSUES: Within normal limits.  BONES: Within normal limits.    IMPRESSION:    CT brain: No acute intracranial hemorrhage, mass effect, midline shift.    CTA neckand brain: No vessel occlusion or significant stenosis.    --- End of Report ---           SILVANO BEY MD; Resident Radiology  This document has been electronically signed.  MERARI NIELSON MD; Attending Radiologist  This document has been electronically signed. Oct 11 2022  3:59AM    < end of copied text >

## 2022-10-12 NOTE — MEDICAL STUDENT PROGRESS NOTE(EDUCATION) - NS MD HP STUD ASPLAN ASSES FT
The patient is a 37 year old woman who was admitted for gradual onset worsening posterior headaches accompanied by dizziness everyday for the past week. The differential diagnosis at this point includes occipital neuralgia vs infectious/inflammatory process vs neurovascular process. Patient was placed NPO after midnight for MRI under anesthesia in the AM (10/12).

## 2022-10-12 NOTE — DISCHARGE NOTE PROVIDER - NSDCCPCAREPLAN_GEN_ALL_CORE_FT
PRINCIPAL DISCHARGE DIAGNOSIS  Diagnosis: HA (headache)  Assessment and Plan of Treatment: You presented to the hospital for headaches. We obtained a CT of the head and CT angio of the vessels of the brain. They did not show any acute findings. We also obtained an MRI of the brain.       PRINCIPAL DISCHARGE DIAGNOSIS  Diagnosis: HA (headache)  Assessment and Plan of Treatment: You presented to the hospital for headaches. We obtained a CT of the head and CT angio of the vessels of the brain. They did not show any acute findings. We also obtained an MRI of the brain that showed a chiari 1 malformation which may or may not be associated with your headache although we will try symptomatic treatment with medications and then consider possible nerve block in the neurology clinic. In the meantime, please consider taking the prescribed hydroxyzine 25mg three times per day, naproxen 500mg twice a day, and gabapentin 100mg three times a day for the duration prescribed. In addition, please follow up with Dr Fortune once discharged. If you develop any new symptoms or worsening of symptoms, please return back to the emergency department.

## 2022-10-13 ENCOUNTER — TRANSCRIPTION ENCOUNTER (OUTPATIENT)
Age: 37
End: 2022-10-13

## 2022-10-13 VITALS
TEMPERATURE: 98 F | OXYGEN SATURATION: 95 % | DIASTOLIC BLOOD PRESSURE: 58 MMHG | HEART RATE: 74 BPM | SYSTOLIC BLOOD PRESSURE: 91 MMHG | RESPIRATION RATE: 18 BRPM

## 2022-10-13 LAB
ANION GAP SERPL CALC-SCNC: 11 MMOL/L — SIGNIFICANT CHANGE UP (ref 5–17)
BUN SERPL-MCNC: 15 MG/DL — SIGNIFICANT CHANGE UP (ref 7–23)
CALCIUM SERPL-MCNC: 8.6 MG/DL — SIGNIFICANT CHANGE UP (ref 8.4–10.5)
CHLORIDE SERPL-SCNC: 97 MMOL/L — SIGNIFICANT CHANGE UP (ref 96–108)
CO2 SERPL-SCNC: 22 MMOL/L — SIGNIFICANT CHANGE UP (ref 22–31)
CREAT SERPL-MCNC: 0.97 MG/DL — SIGNIFICANT CHANGE UP (ref 0.5–1.3)
EGFR: 78 ML/MIN/1.73M2 — SIGNIFICANT CHANGE UP
GLUCOSE SERPL-MCNC: 133 MG/DL — HIGH (ref 70–99)
HCT VFR BLD CALC: 28.6 % — LOW (ref 34.5–45)
HGB BLD-MCNC: 9.4 G/DL — LOW (ref 11.5–15.5)
MAGNESIUM SERPL-MCNC: 1.9 MG/DL — SIGNIFICANT CHANGE UP (ref 1.6–2.6)
MCHC RBC-ENTMCNC: 30.4 PG — SIGNIFICANT CHANGE UP (ref 27–34)
MCHC RBC-ENTMCNC: 32.9 GM/DL — SIGNIFICANT CHANGE UP (ref 32–36)
MCV RBC AUTO: 92.6 FL — SIGNIFICANT CHANGE UP (ref 80–100)
NRBC # BLD: 0 /100 WBCS — SIGNIFICANT CHANGE UP (ref 0–0)
PHOSPHATE SERPL-MCNC: 3.5 MG/DL — SIGNIFICANT CHANGE UP (ref 2.5–4.5)
PLATELET # BLD AUTO: 215 K/UL — SIGNIFICANT CHANGE UP (ref 150–400)
POTASSIUM SERPL-MCNC: 4.3 MMOL/L — SIGNIFICANT CHANGE UP (ref 3.5–5.3)
POTASSIUM SERPL-SCNC: 4.3 MMOL/L — SIGNIFICANT CHANGE UP (ref 3.5–5.3)
RBC # BLD: 3.09 M/UL — LOW (ref 3.8–5.2)
RBC # FLD: 13.7 % — SIGNIFICANT CHANGE UP (ref 10.3–14.5)
SODIUM SERPL-SCNC: 130 MMOL/L — LOW (ref 135–145)
WBC # BLD: 5.87 K/UL — SIGNIFICANT CHANGE UP (ref 3.8–10.5)
WBC # FLD AUTO: 5.87 K/UL — SIGNIFICANT CHANGE UP (ref 3.8–10.5)

## 2022-10-13 PROCEDURE — 70553 MRI BRAIN STEM W/O & W/DYE: CPT

## 2022-10-13 PROCEDURE — 72141 MRI NECK SPINE W/O DYE: CPT | Mod: MA

## 2022-10-13 PROCEDURE — 80061 LIPID PANEL: CPT

## 2022-10-13 PROCEDURE — 84443 ASSAY THYROID STIM HORMONE: CPT

## 2022-10-13 PROCEDURE — 99232 SBSQ HOSP IP/OBS MODERATE 35: CPT | Mod: GC

## 2022-10-13 PROCEDURE — 86140 C-REACTIVE PROTEIN: CPT

## 2022-10-13 PROCEDURE — 70498 CT ANGIOGRAPHY NECK: CPT | Mod: MA

## 2022-10-13 PROCEDURE — A9585: CPT

## 2022-10-13 PROCEDURE — 82607 VITAMIN B-12: CPT

## 2022-10-13 PROCEDURE — G0378: CPT

## 2022-10-13 PROCEDURE — 70553 MRI BRAIN STEM W/O & W/DYE: CPT | Mod: 26

## 2022-10-13 PROCEDURE — 72141 MRI NECK SPINE W/O DYE: CPT | Mod: 26

## 2022-10-13 PROCEDURE — 80048 BASIC METABOLIC PNL TOTAL CA: CPT

## 2022-10-13 PROCEDURE — 0225U NFCT DS DNA&RNA 21 SARSCOV2: CPT

## 2022-10-13 PROCEDURE — 84100 ASSAY OF PHOSPHORUS: CPT

## 2022-10-13 PROCEDURE — 36415 COLL VENOUS BLD VENIPUNCTURE: CPT

## 2022-10-13 PROCEDURE — 70496 CT ANGIOGRAPHY HEAD: CPT | Mod: MA

## 2022-10-13 PROCEDURE — 96374 THER/PROPH/DIAG INJ IV PUSH: CPT

## 2022-10-13 PROCEDURE — 85025 COMPLETE CBC W/AUTO DIFF WBC: CPT

## 2022-10-13 PROCEDURE — 81025 URINE PREGNANCY TEST: CPT

## 2022-10-13 PROCEDURE — 80053 COMPREHEN METABOLIC PANEL: CPT

## 2022-10-13 PROCEDURE — 83735 ASSAY OF MAGNESIUM: CPT

## 2022-10-13 PROCEDURE — 85027 COMPLETE CBC AUTOMATED: CPT

## 2022-10-13 PROCEDURE — 99285 EMERGENCY DEPT VISIT HI MDM: CPT

## 2022-10-13 PROCEDURE — 70450 CT HEAD/BRAIN W/O DYE: CPT | Mod: MA

## 2022-10-13 PROCEDURE — 83036 HEMOGLOBIN GLYCOSYLATED A1C: CPT

## 2022-10-13 PROCEDURE — 82652 VIT D 1 25-DIHYDROXY: CPT

## 2022-10-13 PROCEDURE — 97161 PT EVAL LOW COMPLEX 20 MIN: CPT

## 2022-10-13 RX ORDER — HYDROXYZINE HCL 10 MG
1 TABLET ORAL
Qty: 30 | Refills: 0
Start: 2022-10-13 | End: 2022-10-22

## 2022-10-13 RX ORDER — PREGABALIN 225 MG/1
1 CAPSULE ORAL
Qty: 0 | Refills: 0 | DISCHARGE
Start: 2022-10-13

## 2022-10-13 RX ORDER — ONDANSETRON 8 MG/1
4 TABLET, FILM COATED ORAL ONCE
Refills: 0 | Status: DISCONTINUED | OUTPATIENT
Start: 2022-10-13 | End: 2022-10-13

## 2022-10-13 RX ORDER — GABAPENTIN 400 MG/1
1 CAPSULE ORAL
Qty: 45 | Refills: 0
Start: 2022-10-13 | End: 2022-10-27

## 2022-10-13 RX ADMIN — Medication 10 MILLIGRAM(S): at 05:20

## 2022-10-13 RX ADMIN — Medication 10 MILLIGRAM(S): at 14:55

## 2022-10-13 RX ADMIN — Medication 25 MILLIGRAM(S): at 05:20

## 2022-10-13 RX ADMIN — Medication 25 MILLIGRAM(S): at 14:55

## 2022-10-13 RX ADMIN — PREGABALIN 1000 MICROGRAM(S): 225 CAPSULE ORAL at 13:21

## 2022-10-13 NOTE — PROGRESS NOTE ADULT - ASSESSMENT
Patient WL is a 37 year old right-handed woman with past medical history asthma, previous tension type headaches presents for gradual onset worsening posterior headaches (left worse than right) accompanied by dizziness everyday for the past week. Patient has pain-limited weakness due to her IV of the left deltoid/bicep/tricep 4+/5, and unidirectional left-beating nystagmus. She has tenderness with palpation of the occipital groove, left greater than right. No meningismus. RVP and COVID neg. CT head and CT angio head/neck ruled out acute intracranial pathology. Awaiting labwork, remains hemodynamically stable.    Impression: Intractable headaches, likely occipital neuralgia, rule out malignant pathology including infectious/inflammatory, PRES, neurovascular etiologies.  Patient was placed NPO after midnight for MRI under anesthesia in the AM (10/12).    Plan:  [] MRI brain with and without contrast under anesthesia tentatively 10/13  [] MRI cervical spine w/o contrast under anesthesia tentatively 10/13  [] Inc baclofen to 10mg TID PRN   [] Adding hydroxyzine 25mg q8hrs  [] Toradol Q8H, Reglan 10mg Q8H, Magnesium 1g PRN  [] For severe pain, patient can take opiates if needed per Dr. Fortune  [] Will consider occipital nerve block tomorrow   [] TSH, lipid profile, A1C, CBC, BMP, B12, vitamin D, ESR, CRP  [] neurochecks Q4H, vitals Q4H  [] PT/OT    Pt to follow up with headache specialist, Dr. Jh Fortune. [phone] upon discharge.   Patient WL is a 37 year old right-handed woman with past medical history asthma, previous tension type headaches presents for gradual onset worsening posterior headaches (left worse than right) accompanied by dizziness everyday for the past week. Patient has pain-limited weakness due to her IV of the left deltoid/bicep/tricep 4+/5, and unidirectional left-beating nystagmus. She has tenderness with palpation of the occipital groove, left greater than right. No meningismus. RVP and COVID neg. CT head and CT angio head/neck ruled out acute intracranial pathology. Awaiting labwork, remains hemodynamically stable.    Impression: Intractable headaches, likely occipital neuralgia w/ low suspicion for infectious/inflammatory, PRES, neurovascular etiologies.  S/p MRI.     Plan:  [x] MRI brain with and without contrast,  cervical spine w/o contrast under anesthesia, tolerated well with no complications from sedation when evaluated. Shows Chiari 1 malformation with no report of hydrocephalus. No signs of mass effect. Will be followed up outpatient.   [x] Inc baclofen to 10mg TID PRN   [x] Adding hydroxyzine 25mg q8hrs  [x] Toradol Q8H, Reglan 10mg Q8H, Magnesium 1g PRN  [] For severe pain, patient can take opiates if needed per Dr. Fortune  [] Will follow up outpatient for occipital nerve block if headaches persist  [] TSH, lipid profile, A1C, CBC, BMP, B12, vitamin D, ESR, CRP  [] neurochecks Q4H, vitals Q4H  [] PT/OT    Pt to follow up with headache specialist, Dr. Jh Fortune. [phone] upon discharge.    Patient seen and case discussed w/ Dr Fortune and in agreement with plan.

## 2022-10-13 NOTE — DISCHARGE NOTE NURSING/CASE MANAGEMENT/SOCIAL WORK - NSDCPEFALRISK_GEN_ALL_CORE
For information on Fall & Injury Prevention, visit: [email or web address] OR  [email or web address] OR  https://www.cdc.gov/steadi/patient.html

## 2022-10-13 NOTE — PROGRESS NOTE ADULT - ATTENDING COMMENTS
Ms. Wells is a 37 year old right-handed woman with PMHx significant for asthma, tension type headaches who presented due to subacute worsening posterior headaches accompanied by dizziness over the past week. She was noted intiially to present with nystagmus not observed subsequently.    On exam:  Pt awake and alert and oriented  tenderness with palpation of the occipital groove, left greater than right.   No meningismus. R     and COVID neg.   CT head and CT angio head/neck ruled out acute intracranial pathology.   MRI demonstrated CHiari 1 malformation without hydrocephalus.    Impression: IChairi 1 malformation  cervicogenic headache.    Plan:     baclofen 10mg TID PRN   prn hydroxyzine 25mg q8hrs  Outpt NSAIDS  COnsdier for occipital nerve block  may initiate gabapentin 200mg tid  []

## 2022-10-13 NOTE — DISCHARGE NOTE NURSING/CASE MANAGEMENT/SOCIAL WORK - PATIENT PORTAL LINK FT
You can access the FollowMyHealth Patient Portal offered by [facility] by registering at the following website: [email or web address]. By joining [employer]’s FollowMyHealth portal, you will also be able to view your health information using other applications (apps) compatible with our system.

## 2022-11-08 ENCOUNTER — APPOINTMENT (OUTPATIENT)
Dept: OBGYN | Facility: CLINIC | Age: 37
End: 2022-11-08

## 2022-11-21 ENCOUNTER — APPOINTMENT (OUTPATIENT)
Dept: PAIN MANAGEMENT | Facility: CLINIC | Age: 37
End: 2022-11-21

## 2022-11-28 ENCOUNTER — APPOINTMENT (OUTPATIENT)
Dept: OBGYN | Facility: CLINIC | Age: 37
End: 2022-11-28

## 2022-11-28 VITALS — BODY MASS INDEX: 25.33 KG/M2 | DIASTOLIC BLOOD PRESSURE: 81 MMHG | WEIGHT: 143 LBS | SYSTOLIC BLOOD PRESSURE: 124 MMHG

## 2022-11-28 PROCEDURE — 99213 OFFICE O/P EST LOW 20 MIN: CPT

## 2022-11-28 NOTE — HISTORY OF PRESENT ILLNESS
[FreeTextEntry1] : pt comes for discussion of her recurrent infections . She gets some relief with  medication but it comes back . The itching on the outside but not too much discharge . She currently has no symptoms of itching , discharge or burning . I discussed possible reasons including passing infection to each other . I discussed the possibility of diabetes or pre diabetes as well and to come to the office with an infection without any treatment to see what she has . i will also order blood test as well as urging her to stay away from certain product and latex condoms . Discussed use of panty liners as well but she rarely uses them after the ablation .

## 2022-11-28 NOTE — COUNSELING
[STD (testing, results, tx)] : STD (testing, results, tx) [Vaccines] : vaccines [Lab Results] : lab results

## 2023-01-21 ENCOUNTER — LABORATORY RESULT (OUTPATIENT)
Age: 38
End: 2023-01-21

## 2023-02-13 ENCOUNTER — APPOINTMENT (OUTPATIENT)
Dept: OBGYN | Facility: CLINIC | Age: 38
End: 2023-02-13

## 2023-02-16 NOTE — H&P PST ADULT - NSANTHAGERD_ENT_A_CORE
TREATMENT PLAN (Medication Management Only)        [facility]    Name and Date of Birth: Andrés Ramirez 82 y o  1939  Date of Treatment Plan: February 16, 2023  Diagnosis/Diagnoses:    1  Dysthymic disorder    2  Generalized anxiety disorder      Strengths/Personal Resources for Self-Care: supportive family, supportive friends, taking medications as prescribed, ability to adapt to life changes, ability to communicate needs, ability to communicate well, ability to listen, ability to reason, ability to understand psychiatric illness  Area/Areas of need (in own words): anxiety, depression  1  Long Term Goal: maintain depression  Target Date:6 months - 8/16/2023  Person/Persons responsible for completion of goal: Shanda Goldberg  2  Short Term Objective (s) - How will we reach this goal?:   A  Provider new recommended medication/dosage changes and/or continue medication(s): continue current medications as prescribed Lexapro, Doxepin, Xanax  B  N/A   C  N/A  Target Date:6 months - 8/16/2023  Person/Persons Responsible for Completion of Goal: Shanda Goldberg  Progress Towards Goals: stable  Treatment Modality: medication management every 4 months  Review due 180 days from date of this plan: 6 months - 8/16/2023  Expected length of service: maintenance  My Physician/PA/NP and I have developed this plan together and I agree to work on the goals and objectives  I understand the treatment goals that were developed for my treatment 
No

## 2023-02-23 ENCOUNTER — APPOINTMENT (OUTPATIENT)
Dept: OBGYN | Facility: CLINIC | Age: 38
End: 2023-02-23
Payer: MEDICAID

## 2023-02-23 DIAGNOSIS — B37.31 ACUTE CANDIDIASIS OF VULVA AND VAGINA: ICD-10-CM

## 2023-02-23 PROCEDURE — 99213 OFFICE O/P EST LOW 20 MIN: CPT | Mod: 95

## 2023-03-01 PROBLEM — B37.31 RECURRENT CANDIDIASIS OF VAGINA: Status: ACTIVE | Noted: 2022-11-28

## 2023-03-01 NOTE — HISTORY OF PRESENT ILLNESS
[Home] : at home, [unfilled] , at the time of the visit. [Other Location: e.g. Home (Enter Location, City,State)___] : at [unfilled] [Verbal consent obtained from patient] : the patient, [unfilled] [FreeTextEntry1] : Discussed the results of the hga1c and pre-diabetes . This may account for the recurrent yeast infections . The labs did not have a tsh which we needed to see the results .

## 2023-03-03 NOTE — DISCHARGE NOTE PROVIDER - CARE PROVIDERS DIRECT ADDRESSES
,[email or web address] patient transferred for telemetry monitoring after complaining of chest pain.

## 2023-06-30 ENCOUNTER — APPOINTMENT (OUTPATIENT)
Dept: OBGYN | Facility: CLINIC | Age: 38
End: 2023-06-30
Payer: MEDICAID

## 2023-06-30 PROCEDURE — XXXXX: CPT | Mod: 1L

## 2023-11-28 ENCOUNTER — RX RENEWAL (OUTPATIENT)
Age: 38
End: 2023-11-28

## 2023-12-02 ENCOUNTER — RX RENEWAL (OUTPATIENT)
Age: 38
End: 2023-12-02

## 2023-12-30 ENCOUNTER — RX RENEWAL (OUTPATIENT)
Age: 38
End: 2023-12-30

## 2024-02-07 ENCOUNTER — EMERGENCY (EMERGENCY)
Facility: HOSPITAL | Age: 39
LOS: 1 days | Discharge: HOME | End: 2024-02-07
Attending: EMERGENCY MEDICINE
Payer: MEDICAID

## 2024-02-07 VITALS
TEMPERATURE: 98 F | HEART RATE: 76 BPM | RESPIRATION RATE: 18 BRPM | WEIGHT: 143.08 LBS | SYSTOLIC BLOOD PRESSURE: 124 MMHG | DIASTOLIC BLOOD PRESSURE: 91 MMHG | HEIGHT: 63 IN | OXYGEN SATURATION: 99 %

## 2024-02-07 VITALS
HEART RATE: 80 BPM | OXYGEN SATURATION: 98 % | SYSTOLIC BLOOD PRESSURE: 125 MMHG | TEMPERATURE: 98 F | DIASTOLIC BLOOD PRESSURE: 85 MMHG | RESPIRATION RATE: 16 BRPM

## 2024-02-07 DIAGNOSIS — Z98.51 TUBAL LIGATION STATUS: Chronic | ICD-10-CM

## 2024-02-07 DIAGNOSIS — Z98.890 OTHER SPECIFIED POSTPROCEDURAL STATES: Chronic | ICD-10-CM

## 2024-02-07 PROCEDURE — 99283 EMERGENCY DEPT VISIT LOW MDM: CPT

## 2024-02-07 PROCEDURE — 93005 ELECTROCARDIOGRAM TRACING: CPT

## 2024-02-07 PROCEDURE — 99284 EMERGENCY DEPT VISIT MOD MDM: CPT

## 2024-02-07 RX ORDER — IBUPROFEN 200 MG
600 TABLET ORAL ONCE
Refills: 0 | Status: COMPLETED | OUTPATIENT
Start: 2024-02-07 | End: 2024-02-07

## 2024-02-07 RX ORDER — ACETAMINOPHEN 500 MG
975 TABLET ORAL ONCE
Refills: 0 | Status: COMPLETED | OUTPATIENT
Start: 2024-02-07 | End: 2024-02-07

## 2024-02-07 RX ORDER — DIAZEPAM 5 MG
1 TABLET ORAL
Qty: 9 | Refills: 0
Start: 2024-02-07 | End: 2024-02-09

## 2024-02-07 RX ORDER — LIDOCAINE 4 G/100G
1 CREAM TOPICAL ONCE
Refills: 0 | Status: COMPLETED | OUTPATIENT
Start: 2024-02-07 | End: 2024-02-07

## 2024-02-07 RX ORDER — DIAZEPAM 5 MG
5 TABLET ORAL ONCE
Refills: 0 | Status: DISCONTINUED | OUTPATIENT
Start: 2024-02-07 | End: 2024-02-07

## 2024-02-07 RX ADMIN — LIDOCAINE 1 PATCH: 4 CREAM TOPICAL at 11:38

## 2024-02-07 RX ADMIN — Medication 600 MILLIGRAM(S): at 11:38

## 2024-02-07 RX ADMIN — Medication 975 MILLIGRAM(S): at 15:16

## 2024-02-07 RX ADMIN — Medication 5 MILLIGRAM(S): at 11:38

## 2024-02-07 NOTE — ED ADULT NURSE NOTE - NSFALLUNIVINTERV_ED_ALL_ED
Bed/Stretcher in lowest position, wheels locked, appropriate side rails in place/Call bell, personal items and telephone in reach/Instruct patient to call for assistance before getting out of bed/chair/stretcher/Non-slip footwear applied when patient is off stretcher/[employer] to call system/Physically safe environment - no spills, clutter or unnecessary equipment/Purposeful proactive rounding/Room/bathroom lighting operational, light cord in reach

## 2024-02-07 NOTE — ED ADULT NURSE NOTE - OBJECTIVE STATEMENT
38 y.o F BIB  p/w c/o neck pain. A+OX4. Pt states woke up at 0445 AM last night w/ sudden onset R sided neck pain that radiated down to R breast/arm/back. States unsure if slept in bad position. denies any trauma or heavy lifting. States has never had pain like this before. Upon initial assessment, no swelling/ redness noted to the area. Limited neck ROM due to pain. Normal ROM upper/ lower extremities. No sick contacts. No complaints at this time. PMH asthma and anemia. Safety maintained.

## 2024-02-07 NOTE — ED PROVIDER NOTE - ATTENDING APP SHARED VISIT CONTRIBUTION OF CARE
Attending MD Costa:   I personally have seen and examined this patient.  Physician assistant note reviewed and agree on plan of care and except where noted.  See below for details.     Seen in [place] [facility], accompanied by   PMD Dr. Finch    38F with PMH/PSH including anemia, tension headaches, hysteroscopic myomectomy presents to the ED with R side headache, neck pain, RUE, R chest pain.  Reports went to bed without pain and woke up at around 4am with pain.  Reports pain started in neck and radiates.  Reports worse with movement.    Reports Tylenol x 3 at 5:30am, denies minimal improvement.  Denies visiting chiropractor, masseuse.  Denies inciting event, trauma, heavy lifting.  Denies change in vision, double vision, sudden loss of vision. Denies numbness, weakness or tingling in extremities. Denies shortness of breath, abdominal pain, nausea, vomiting, diarrhea, urinary complaints.     TO BE COMPLETED Attending MD Costa:   I personally have seen and examined this patient.  Physician assistant note reviewed and agree on plan of care and except where noted.  See below for details.     Seen in [place] [facility], accompanied by   PMD Dr. Finch  Does not follow with neuro    38F with PMH/PSH including asthma (never intubated, never ICU), tension headaches, anemia, s/p hysteroscopic myomectomy, s/p tubal ligation, presents to the ED with R side headache, neck pain radiating to R side of head, RUE, R upper chest pain.  Reports went to bed without pain and woke up at around 4-5am with pain.  Reports pain woke her from sleep.  Reports pain started in neck and radiates.  Reports worse with movement.  Reports Tylenol x 3 at 5:30am, denies minimal improvement.  Denies visiting chiropractor, masseuse.  Denies inciting event, trauma, heavy lifting.  Denies change in vision, double vision, sudden loss of vision. Denies numbness, weakness or tingling in extremities. Denies shortness of breath, abdominal pain, nausea, vomiting, diarrhea, urinary complaints.     Exam:   General: NAD  HENT: head NCAT, airway patent  Eyes: anicteric, no conjunctival injection, EOMI, PERRL  Lungs: lungs CTAB with good inspiratory effort, no wheezing, no rhonchi, no rales  Cardiac: +S1S2, no obvious m/r/g  GI: abdomen soft with +BS, NT, ND  MSK: limited ROM secondary to pain, +palpable muscle spasm in R trap area, no overlying ecchymosis or rash, moving all extremities freely, no midline spine tenderness  Neuro: moving all extremities spontaneously, nonfocal, CN 2-12 grossly intact  Psych: normal mood and affect     A/P: 38F with L neck pain, suspect MSK, DDx includes torticollis, muscle spasm, will give meds, screening EKG performed in triage is nonischemic

## 2024-02-07 NOTE — ED PROVIDER NOTE - PROGRESS NOTE DETAILS
Symptoms wit some improvement s/p valium.  Patient ambulatory independently.  Will DC home with outpatient ortho follow up. -Marshal Shine PA-C

## 2024-02-07 NOTE — ED PROVIDER NOTE - PATIENT PORTAL LINK FT
You can access the FollowMyHealth Patient Portal offered by [facility] by registering at the following website: [email or web address]. By joining [email or web address]’s FollowMyHealth portal, you will also be able to view your health information using other applications (apps) compatible with our system.

## 2024-02-07 NOTE — ED PROVIDER NOTE - NSFOLLOWUPINSTRUCTIONS_ED_ALL_ED_FT
Continue current home medications.  Stay well-hydrated.  Take Tylenol 1000 mg every 6-8 hours and/or ibuprofen 600 mg every 6 hours as needed for pain.  Take Valium 5 mg every 8 hours as needed for severe pain/spasm.  Follow-up with your primary care provider in 2 to 3 days.  Bring copy results with appointment.  Follow-up with orthopedics as needed for persistent pain.  Call to schedule appointment.  Return to the ER for worsening pain, weakness, numbness or any other concerning symptoms.

## 2024-02-07 NOTE — ED PROVIDER NOTE - NSFOLLOWUPCLINICS_GEN_ALL_ED_FT
[facility]  Orthopedic Surgery  [address] [address]  Phone: [phone]  Fax:

## 2024-05-08 NOTE — PATIENT PROFILE ADULT - HOME ACCESSIBILITY CONCERNS
From: Lluvia Camejo  To: Dr. Analia Conley  Sent: 5/8/2024 12:27 PM EDT  Subject: Prescription Refill?    I need a prescription refill for [place]. I tried to call but no one answered. Thanks.  
Pt requesting refill on Voltaren  
none

## 2024-05-15 ENCOUNTER — LABORATORY RESULT (OUTPATIENT)
Age: 39
End: 2024-05-15

## 2024-05-15 ENCOUNTER — APPOINTMENT (OUTPATIENT)
Dept: OBGYN | Facility: CLINIC | Age: 39
End: 2024-05-15
Payer: MEDICAID

## 2024-05-15 VITALS — DIASTOLIC BLOOD PRESSURE: 84 MMHG | WEIGHT: 144 LBS | BODY MASS INDEX: 25.51 KG/M2 | SYSTOLIC BLOOD PRESSURE: 124 MMHG

## 2024-05-15 DIAGNOSIS — E11.9 TYPE 2 DIABETES MELLITUS W/OUT COMPLICATIONS: ICD-10-CM

## 2024-05-15 DIAGNOSIS — Z01.419 ENCOUNTER FOR GYNECOLOGICAL EXAMINATION (GENERAL) (ROUTINE) W/OUT ABNORMAL FINDINGS: ICD-10-CM

## 2024-05-15 DIAGNOSIS — N76.0 ACUTE VAGINITIS: ICD-10-CM

## 2024-05-15 PROCEDURE — 99213 OFFICE O/P EST LOW 20 MIN: CPT | Mod: 25

## 2024-05-15 PROCEDURE — 99395 PREV VISIT EST AGE 18-39: CPT

## 2024-05-15 RX ORDER — FLUCONAZOLE 150 MG/1
150 TABLET ORAL
Qty: 16 | Refills: 0 | Status: ACTIVE | COMMUNITY
Start: 2022-02-09 | End: 1900-01-01

## 2024-05-16 PROBLEM — E11.9 DIABETES: Status: ACTIVE | Noted: 2023-03-01

## 2024-05-16 PROBLEM — Z01.419 WOMEN'S ANNUAL ROUTINE GYNECOLOGICAL EXAMINATION: Status: ACTIVE | Noted: 2020-11-05

## 2024-05-16 PROBLEM — N76.0 VAGINITIS AND VULVOVAGINITIS: Status: ACTIVE | Noted: 2024-05-16 | Resolved: 2024-06-15

## 2024-05-16 NOTE — HISTORY OF PRESENT ILLNESS
[FreeTextEntry1] : pt comes for annual exam . She needs a pap , she complains of vulvar itching as well as vaginal discharge . I discussed her diet for the pre diabetes and she has not been doing the correct diet .

## 2024-05-16 NOTE — PHYSICAL EXAM
[Appropriately responsive] : appropriately responsive [Alert] : alert [No Acute Distress] : no acute distress [No Lymphadenopathy] : no lymphadenopathy [Regular Rate Rhythm] : regular rate rhythm [No Murmurs] : no murmurs [Clear to Auscultation B/L] : clear to auscultation bilaterally [Soft] : soft [Non-tender] : non-tender [Non-distended] : non-distended [No HSM] : No HSM [No Lesions] : no lesions [No Mass] : no mass [Oriented x3] : oriented x3 [Examination Of The Breasts] : a normal appearance [No Masses] : no breast masses were palpable [Vulvitis] : vulvitis [Labia Majora] : normal [Labia Minora] : normal [Discharge] : a  ~M vaginal discharge was present [Scant] : scant [White] : white [Cheesy] : cheesy [Normal] : normal [Uterine Adnexae] : normal

## 2024-05-21 ENCOUNTER — LABORATORY RESULT (OUTPATIENT)
Age: 39
End: 2024-05-21

## 2024-07-18 ENCOUNTER — APPOINTMENT (OUTPATIENT)
Dept: OBGYN | Facility: CLINIC | Age: 39
End: 2024-07-18

## 2024-07-25 ENCOUNTER — APPOINTMENT (OUTPATIENT)
Dept: OBGYN | Facility: CLINIC | Age: 39
End: 2024-07-25
Payer: MEDICAID

## 2024-07-25 VITALS
OXYGEN SATURATION: 99 % | SYSTOLIC BLOOD PRESSURE: 126 MMHG | DIASTOLIC BLOOD PRESSURE: 91 MMHG | WEIGHT: 141 LBS | BODY MASS INDEX: 24.98 KG/M2 | HEART RATE: 103 BPM

## 2024-07-25 DIAGNOSIS — N76.2 ACUTE VULVITIS: ICD-10-CM

## 2024-07-25 PROCEDURE — 57454 BX/CURETT OF CERVIX W/SCOPE: CPT

## 2024-07-25 RX ORDER — CLOTRIMAZOLE AND BETAMETHASONE DIPROPIONATE 10; .5 MG/G; MG/G
1-0.05 CREAM TOPICAL TWICE DAILY
Qty: 1 | Refills: 2 | Status: ACTIVE | COMMUNITY
Start: 2024-07-25 | End: 1900-01-01

## 2024-07-25 NOTE — PROCEDURE
[Colposcopy] : Colposcopy  [Time out performed] : Pre-procedure time out performed.  Patient's name, date of birth and procedure confirmed. [Consent Obtained] : Consent obtained [Risks] : risks [Benefits] : benefits [Alternatives] : alternatives [Patient] : patient [Infection] : infection [Bleeding] : bleeding [Allergic Reaction] : allergic reaction [ASCUS] : ASCUS [No Premedication] : no premedication [Colposcopy Adequate] : colposcopy adequate [SCI Fully Visualized] : SCI fully visualized [No Abnormalities] : no abnormalities [Hemostasis Obtained] : Hemostasis obtained [Tolerated Well] : the patient tolerated the procedure well [Pap Performed] : pap not performed [ECC Performed] : ECC not performed [Biopsy] : biopsy not taken [de-identified] : hpv

## 2024-08-07 NOTE — PROGRESS NOTE ADULT - SUBJECTIVE AND OBJECTIVE BOX
Neurology Progress Note    SUBJECTIVE/OBJECTIVE/INTERVAL EVENTS: Patient seen and examined at bedside w/ neuro attending and team. Reports headache still persistent 9/10 posterior occipital region, nonradiating down to neck. Has tenderness around neck. Sharp stabbing pain. Worsened with movement of head to R/L. No visual symptoms. Some intermittent nausea. No CP, SOB, numbness/tingling/weakness of extremities except while getting tylenol yesterday felt numbness of distal legs from ankles down. Able to ambulate.       VITALS & EXAMINATION:  Vital Signs Last 24 Hrs  T(C): 36.6 (13 Oct 2022 05:37), Max: 36.9 (12 Oct 2022 08:38)  T(F): 97.9 (13 Oct 2022 05:37), Max: 98.4 (12 Oct 2022 08:38)  HR: 66 (13 Oct 2022 05:37) (66 - 80)  BP: 94/60 (13 Oct 2022 05:37) (94/60 - 121/82)  BP(mean): --  RR: 18 (13 Oct 2022 05:37) (18 - 18)  SpO2: 97% (13 Oct 2022 05:37) (95% - 98%)    Parameters below as of 13 Oct 2022 05:37  Patient On (Oxygen Delivery Method): room air    General:  Constitutional: Female, appears stated age   Head: Normocephalic; Eyes: clear sclera;   Extremities: No cyanosis; Skin: No veronika edema of LE  Resp: breathing comfortably     Neurological (>12):  MS: Awake, alert.  Follows commands. Attends to examiner  Language: Speech is clear, fluent, good repetition,  comprehension, registration of words.  CNs: PERRL (R 3mm, L 3mm). VFF. EOMI. V1-3 intact LT, No facial asymmetry b/l. Hearing grossly normal b/l. Tongue midline.   Significant tenderness in occipital region L > R to deep palpation. No pain to tenderness for TMJ eval.     Motor - Normal bulk and tone throughout. No pronator drift.    L/R         Deltoid  5/5    Biceps   5/5      Triceps  5/5         Wrist Extension 5/5   Wrist Flexion  5/5      5/5   L/R         Hip Flexion  5/5    Hip Extension  5/5  Knee Extension  5/5  Dorsiflexion  5/5      Plantar Flexion 5/5     Sensation: Intact to LT b/l. Cortical: Extinction on DSS (neglect): none  Reflexes L/R:  Biceps(C5) 2/2  BR(C6) 2/2   Triceps(C7)  2/2 Patellar(L4)   2/2   Toes: mute  Coordination: No dysmetria to FTN b/l UE  Gait: Normal Romberg. No postural instability. Normal stance.     LABORATORY:  CBC                       13.1   7.63  )-----------( 405      ( 11 Oct 2022 16:58 )             39.0     Chem 10-11    137  |  104  |  11  ----------------------------<  166<H>  3.8   |  22  |  0.57    Ca    9.0      11 Oct 2022 16:58    TPro  7.1  /  Alb  4.3  /  TBili  0.3  /  DBili  x   /  AST  12  /  ALT  14  /  AlkPhos  77  10-11    LFTs LIVER FUNCTIONS - ( 11 Oct 2022 16:58 )  Alb: 4.3 g/dL / Pro: 7.1 g/dL / ALK PHOS: 77 U/L / ALT: 14 U/L / AST: 12 U/L / GGT: x           Coagulopathy   Lipid Panel 10-11 Chol 165 LDL -- HDL 35<L> Trig 140  A1c   Cardiac enzymes     U/A   CSF  Immunological  Other    STUDIES & IMAGING: (EEG, CT, MR, U/S, TTE/DUNG):      < from: CT Angio Neck w/ IV Cont (10.11.22 @ 03:38) >    ACC: [id number] EXAM:  CT ANGIO BRAIN (W) IC                        ACC: [id number] EXAM:  CT ANGIO NECK (W) IC                        ACC: [id number] EXAM:  CT BRAIN                          PROCEDURE DATE:  10/11/2022          INTERPRETATION:  CLINICAL INDICATION: Headache and vertigo.    TECHNIQUE:  Noncontrast CT of the head was performed followed by CT angiography of   the neck and brain.  Two-dimensional sagittal and coronal reformats and maximum intensity   projection reformats were created.  Intravenous Contrast: 70 mL Omnipaque-350 was administered. 0 mL was   discarded.    COMPARISON: None.    FINDINGS:    CT BRAIN:    No acute intracranial hemorrhage, mass effect, or midline shift. No   abnormal extra-axial collection. No hydrocephalus.    Left maxillary sinus mucous retention cyst versus polyp. The   tympanomastoid cavities are clear. The orbits are unremarkable. Calvarium   is intact.    CTA NECK AND BRAIN:  GREAT VESSELS: Within normal limits.  COMMON CAROTID ARTERIES: Within normal limits.  CAROTID BIFURCATIONS: Within normal limits.  INTERNAL CAROTID ARTERIES: Patent without stenosis.    VERTEBRAL ARTERIES: Patent without stenosis.    ANTERIOR CIRCULATION: Patent without stenosis or aneurysm.  POSTERIOR CIRCULATION: Patent without stenosis or aneurysm.    VISUALIZED LUNGS: Within normal limits.  NECK SOFT TISSUES: Within normal limits.  BONES: Within normal limits.    IMPRESSION:    CT brain: No acute intracranial hemorrhage, mass effect, midline shift.    CTA neckand brain: No vessel occlusion or significant stenosis.    --- End of Report ---     SILVANO BEY MD; Resident Radiology  This document has been electronically signed.  MERARI NIELSON MD; Attending Radiologist  This document has been electronically signed. Oct 11 2022  3:59AM    < end of copied text >     Neurology Progress Note    SUBJECTIVE/OBJECTIVE/INTERVAL EVENTS: Patient seen and examined at bedside after MRI / sedation. Feels headache is better now <7/10 with some relief with hydroxyzine. No visual symptoms. Some intermittent nausea. No CP, SOB, numbness/tingling/weakness of extremities except while getting tylenol yesterday felt numbness of distal legs from ankles down. Able to ambulate.       VITALS & EXAMINATION:  Vital Signs Last 24 Hrs  T(C): 36.6 (13 Oct 2022 05:37), Max: 36.9 (12 Oct 2022 08:38)  T(F): 97.9 (13 Oct 2022 05:37), Max: 98.4 (12 Oct 2022 08:38)  HR: 66 (13 Oct 2022 05:37) (66 - 80)  BP: 94/60 (13 Oct 2022 05:37) (94/60 - 121/82)  BP(mean): --  RR: 18 (13 Oct 2022 05:37) (18 - 18)  SpO2: 97% (13 Oct 2022 05:37) (95% - 98%)    Parameters below as of 13 Oct 2022 05:37  Patient On (Oxygen Delivery Method): room air    General:  Constitutional: Female, appears stated age   Head: Normocephalic; Eyes: clear sclera;   Extremities: No cyanosis; Skin: No veronika edema of LE  Resp: breathing comfortably     Neurological (>12):  MS: Awake, alert.  Follows commands. Attends to examiner  Language: Speech is clear, fluent, good repetition,  comprehension, registration of words.  CNs: PERRL (R 3mm, L 3mm). VFF. EOMI. V1-3 intact LT, No facial asymmetry b/l. Hearing grossly normal b/l. Tongue midline.   Significant tenderness in occipital region L > R to deep palpation. No pain to tenderness for TMJ eval.     Motor - Normal bulk and tone throughout. No pronator drift.    L/R         Deltoid  5/5    Biceps   5/5      Triceps  5/5         Wrist Extension 5/5   Wrist Flexion  5/5      5/5   L/R         Hip Flexion  5/5    Hip Extension  5/5  Knee Extension  5/5  Dorsiflexion  5/5      Plantar Flexion 5/5     Sensation: Intact to LT b/l. Cortical: Extinction on DSS (neglect): none  Reflexes L/R:  Biceps(C5) 2/2  BR(C6) 2/2   Triceps(C7)  2/2 Patellar(L4)   2/2   Toes: mute  Coordination: No dysmetria to FTN b/l UE  Gait: Normal Romberg. No postural instability. Normal stance.     LABORATORY:  CBC                       13.1   7.63  )-----------( 405      ( 11 Oct 2022 16:58 )             39.0     Chem 10-11    137  |  104  |  11  ----------------------------<  166<H>  3.8   |  22  |  0.57    Ca    9.0      11 Oct 2022 16:58    TPro  7.1  /  Alb  4.3  /  TBili  0.3  /  DBili  x   /  AST  12  /  ALT  14  /  AlkPhos  77  10-11    LFTs LIVER FUNCTIONS - ( 11 Oct 2022 16:58 )  Alb: 4.3 g/dL / Pro: 7.1 g/dL / ALK PHOS: 77 U/L / ALT: 14 U/L / AST: 12 U/L / GGT: x           Coagulopathy   Lipid Panel 10-11 Chol 165 LDL -- HDL 35<L> Trig 140  A1c   Cardiac enzymes     U/A   CSF  Immunological  Other    STUDIES & IMAGING: (EEG, CT, MR, U/S, TTE/DUNG):      < from: CT Angio Neck w/ IV Cont (10.11.22 @ 03:38) >    ACC: [id number] EXAM:  CT [facility]GIO [facility]RAIN (W[facility]                        ACC: [id number] EXAM:  CT ANGIO NECK (W)[facility]                        ACC: [id number] EXAM:  CT BRAIN                          PROCEDURE DATE:  10/11/2022          INTERPRETATION:  CLINICAL INDICATION: Headache and vertigo.    TECHNIQUE:  Noncontrast CT of the head was performed followed by CT angiography of   the neck and brain.  Two-dimensional sagittal and coronal reformats and maximum intensity   projection reformats were created.  Intravenous Contrast: 70 mL Omnipaque-350 was administered. 0 mL was   discarded.    COMPARISON: None.    FINDINGS:    CT BRAIN:    No acute intracranial hemorrhage, mass effect, or midline shift. No   abnormal extra-axial collection. No hydrocephalus.    Left maxillary sinus mucous retention cyst versus polyp. The   tympanomastoid cavities are clear. The orbits are unremarkable. Calvarium   is intact.    CTA NECK AND BRAIN:  GREAT VESSELS: Within normal limits.  COMMON CAROTID ARTERIES: Within normal limits.  CAROTID BIFURCATIONS: Within normal limits.  INTERNAL CAROTID ARTERIES: Patent without stenosis.    VERTEBRAL ARTERIES: Patent without stenosis.    ANTERIOR CIRCULATION: Patent without stenosis or aneurysm.  POSTERIOR CIRCULATION: Patent without stenosis or aneurysm.    VISUALIZED LUNGS: Within normal limits.  NECK SOFT TISSUES: Within normal limits.  BONES: Within normal limits.    IMPRESSION:    CT brain: No acute intracranial hemorrhage, mass effect, midline shift.    CTA neck and brain: No vessel occlusion or significant stenosis.    --- End of Report ---     SILVANO BEY MD; Resident Radiology  This document has been electronically signed.  MERARI NIELSON MD; Attending Radiologist  This document has been electronically signed. Oct 11 2022  3:59AM    < end of copied text >    < from: MR Head w/wo IV Cont (10.13.22 @ 12:32) >    ACC: [id number] EXAM:  MR BRAIN WAW IC                        ACC: [id number] EXAM:  MR SPINE CERVICAL                          PROCEDURE DATE:  10/13/2022          INTERPRETATION:  Clinical indication: Headache.    MRI of brain was performed using sagittal T1 axial T1 T2 T2 FLAIR   diffusion and susceptibility weighted sequence. The patient was injected   with approximately 7.5 cc of gadavist IV and sagittal coronal and axial   T1-weighted sequences performed.    This exam is compared with prior head CT performed on October 11, 2022.    The lateral ventricles have a normal configuration.    Low-lying cerebellar tonsils is seen. The tonsils extend approximately   5.9 mm below the foramen. This compatible with a Chiari I malformation.    Tiny subcentimeter focus of nonenhancing T2 prolongation is seen   involving the anterior right centrum semiovale region. This finding is   nonspecific and could be related to migraine headaches though other   possibilities include areas of infection inflammation ischemia or   demyelinating disease (less likely).    There is no acute hemorrhage mass or mass effect seen.    Evaluation of the diffusion weighted sequence demonstrates no abnormal   areas of restricted diffusion to suggest acute infarct.    There are no abnormal intra or extra-axial collections seen    The large vessels demonstrate normal flow voids.    Left maxilla polyp versus retention cyst is seen.    Both mastoid and middle ear regions appear clear.    IMPRESSION: Calvarial malformation.    MRI of the cervical spine was performed using sagittal T1-T2 and   T2-weighted sequence with fat suppression. Axial T1-T2 and gradient echo   sequences were performed as well.    Loss of the normal cervical lordosis is seen.    The vertebral body height alignment and marrow signal appears normal.    Disc desiccation is seen involving the C2-3 C4-5 and C5-6 levels. These   findings are secondary to chronic degenerative changes    C2-3: Normal    C3-4: Normal    C4-5: Normal    C5-6: Normal    C6-7: Normal    C7-T1: Normal    T1-2: Normal    The spinal cord demonstrates normal signal and caliber.    Dilation of the paraspinal soft tissues appear normal.    IMPRESSION: Mild degenerative changes involving the cervical spine as   described above.    --- End of Report ---    LIZBETH URENA MD; Attending Radiologist  This document has been electronically signed. Oct 13 2022  1:29PM    < end of copied text >   [Follow-Up] : a follow-up evaluation of